# Patient Record
Sex: MALE | Employment: PART TIME | URBAN - METROPOLITAN AREA
[De-identification: names, ages, dates, MRNs, and addresses within clinical notes are randomized per-mention and may not be internally consistent; named-entity substitution may affect disease eponyms.]

---

## 2018-08-01 ENCOUNTER — HOSPITAL ENCOUNTER (INPATIENT)
Facility: CLINIC | Age: 43
LOS: 2 days | Discharge: HOME | DRG: 189 | End: 2018-08-03
Attending: EMERGENCY MEDICINE | Admitting: INTERNAL MEDICINE

## 2018-08-01 ENCOUNTER — APPOINTMENT (OUTPATIENT)
Dept: GENERAL RADIOLOGY | Facility: CLINIC | Age: 43
DRG: 189 | End: 2018-08-01
Attending: EMERGENCY MEDICINE

## 2018-08-01 DIAGNOSIS — J45.901 SEVERE ASTHMA WITH ACUTE EXACERBATION, UNSPECIFIED WHETHER PERSISTENT: ICD-10-CM

## 2018-08-01 LAB
ALBUMIN SERPL-MCNC: 3.8 G/DL (ref 3.4–5)
ALP SERPL-CCNC: 120 U/L (ref 40–150)
ALT SERPL W P-5'-P-CCNC: 41 U/L (ref 0–70)
ANION GAP SERPL CALCULATED.3IONS-SCNC: 7 MMOL/L (ref 3–14)
AST SERPL W P-5'-P-CCNC: 29 U/L (ref 0–45)
BASE DEFICIT BLDV-SCNC: 1 MMOL/L
BASE EXCESS BLDV CALC-SCNC: 0.1 MMOL/L
BASOPHILS # BLD AUTO: 0 10E9/L (ref 0–0.2)
BASOPHILS NFR BLD AUTO: 0.3 %
BILIRUB SERPL-MCNC: 0.4 MG/DL (ref 0.2–1.3)
BUN SERPL-MCNC: 10 MG/DL (ref 7–30)
CALCIUM SERPL-MCNC: 8.3 MG/DL (ref 8.5–10.1)
CHLORIDE SERPL-SCNC: 107 MMOL/L (ref 94–109)
CO2 SERPL-SCNC: 26 MMOL/L (ref 20–32)
CREAT SERPL-MCNC: 0.73 MG/DL (ref 0.66–1.25)
DIFFERENTIAL METHOD BLD: NORMAL
EOSINOPHIL # BLD AUTO: 0.5 10E9/L (ref 0–0.7)
EOSINOPHIL NFR BLD AUTO: 4.3 %
ERYTHROCYTE [DISTWIDTH] IN BLOOD BY AUTOMATED COUNT: 13.1 % (ref 10–15)
GFR SERPL CREATININE-BSD FRML MDRD: >90 ML/MIN/1.7M2
GLUCOSE BLDC GLUCOMTR-MCNC: 205 MG/DL (ref 70–99)
GLUCOSE BLDC GLUCOMTR-MCNC: 240 MG/DL (ref 70–99)
GLUCOSE SERPL-MCNC: 141 MG/DL (ref 70–99)
HBA1C MFR BLD: 6.3 % (ref 0–5.6)
HCO3 BLDV-SCNC: 26 MMOL/L (ref 21–28)
HCO3 BLDV-SCNC: 27 MMOL/L (ref 21–28)
HCT VFR BLD AUTO: 44.9 % (ref 40–53)
HGB BLD-MCNC: 15.6 G/DL (ref 13.3–17.7)
IMM GRANULOCYTES # BLD: 0 10E9/L (ref 0–0.4)
IMM GRANULOCYTES NFR BLD: 0.2 %
INTERPRETATION ECG - MUSE: NORMAL
LYMPHOCYTES # BLD AUTO: 4 10E9/L (ref 0.8–5.3)
LYMPHOCYTES NFR BLD AUTO: 38 %
MCH RBC QN AUTO: 30.3 PG (ref 26.5–33)
MCHC RBC AUTO-ENTMCNC: 34.7 G/DL (ref 31.5–36.5)
MCV RBC AUTO: 87 FL (ref 78–100)
MONOCYTES # BLD AUTO: 0.5 10E9/L (ref 0–1.3)
MONOCYTES NFR BLD AUTO: 5 %
NEUTROPHILS # BLD AUTO: 5.5 10E9/L (ref 1.6–8.3)
NEUTROPHILS NFR BLD AUTO: 52.2 %
NRBC # BLD AUTO: 0 10*3/UL
NRBC BLD AUTO-RTO: 0 /100
O2/TOTAL GAS SETTING VFR VENT: 100 %
OXYHGB MFR BLDV: 49 %
OXYHGB MFR BLDV: 92 %
PCO2 BLDV: 49 MM HG (ref 40–50)
PCO2 BLDV: 52 MM HG (ref 40–50)
PH BLDV: 7.31 PH (ref 7.32–7.43)
PH BLDV: 7.34 PH (ref 7.32–7.43)
PLATELET # BLD AUTO: 250 10E9/L (ref 150–450)
PO2 BLDV: 27 MM HG (ref 25–47)
PO2 BLDV: 69 MM HG (ref 25–47)
POTASSIUM SERPL-SCNC: 3.6 MMOL/L (ref 3.4–5.3)
PROCALCITONIN SERPL-MCNC: 0.73 NG/ML
PROT SERPL-MCNC: 7.9 G/DL (ref 6.8–8.8)
RBC # BLD AUTO: 5.15 10E12/L (ref 4.4–5.9)
SODIUM SERPL-SCNC: 140 MMOL/L (ref 133–144)
TROPONIN I SERPL-MCNC: <0.015 UG/L (ref 0–0.04)
WBC # BLD AUTO: 10.5 10E9/L (ref 4–11)

## 2018-08-01 PROCEDURE — 25000128 H RX IP 250 OP 636

## 2018-08-01 PROCEDURE — 84484 ASSAY OF TROPONIN QUANT: CPT | Performed by: EMERGENCY MEDICINE

## 2018-08-01 PROCEDURE — 83036 HEMOGLOBIN GLYCOSYLATED A1C: CPT | Performed by: INTERNAL MEDICINE

## 2018-08-01 PROCEDURE — 82805 BLOOD GASES W/O2 SATURATION: CPT | Performed by: EMERGENCY MEDICINE

## 2018-08-01 PROCEDURE — 80053 COMPREHEN METABOLIC PANEL: CPT | Performed by: EMERGENCY MEDICINE

## 2018-08-01 PROCEDURE — 99223 1ST HOSP IP/OBS HIGH 75: CPT | Mod: AI | Performed by: INTERNAL MEDICINE

## 2018-08-01 PROCEDURE — 25000132 ZZH RX MED GY IP 250 OP 250 PS 637: Performed by: INTERNAL MEDICINE

## 2018-08-01 PROCEDURE — 94660 CPAP INITIATION&MGMT: CPT

## 2018-08-01 PROCEDURE — 96372 THER/PROPH/DIAG INJ SC/IM: CPT

## 2018-08-01 PROCEDURE — 00000146 ZZHCL STATISTIC GLUCOSE BY METER IP

## 2018-08-01 PROCEDURE — 21000000 ZZH R&B IMCU HEART CARE

## 2018-08-01 PROCEDURE — 25000125 ZZHC RX 250: Performed by: INTERNAL MEDICINE

## 2018-08-01 PROCEDURE — 94640 AIRWAY INHALATION TREATMENT: CPT

## 2018-08-01 PROCEDURE — 99285 EMERGENCY DEPT VISIT HI MDM: CPT | Mod: 25

## 2018-08-01 PROCEDURE — 25000128 H RX IP 250 OP 636: Performed by: INTERNAL MEDICINE

## 2018-08-01 PROCEDURE — 25000131 ZZH RX MED GY IP 250 OP 636 PS 637: Performed by: INTERNAL MEDICINE

## 2018-08-01 PROCEDURE — 27210995 ZZH RX 272: Performed by: INTERNAL MEDICINE

## 2018-08-01 PROCEDURE — 40000275 ZZH STATISTIC RCP TIME EA 10 MIN

## 2018-08-01 PROCEDURE — 25000125 ZZHC RX 250

## 2018-08-01 PROCEDURE — 96374 THER/PROPH/DIAG INJ IV PUSH: CPT

## 2018-08-01 PROCEDURE — 36415 COLL VENOUS BLD VENIPUNCTURE: CPT | Performed by: INTERNAL MEDICINE

## 2018-08-01 PROCEDURE — 93005 ELECTROCARDIOGRAM TRACING: CPT

## 2018-08-01 PROCEDURE — 84145 PROCALCITONIN (PCT): CPT | Performed by: INTERNAL MEDICINE

## 2018-08-01 PROCEDURE — 40000885 ZZH STATISTIC STEP DOWN HRS EVENING

## 2018-08-01 PROCEDURE — 85025 COMPLETE CBC W/AUTO DIFF WBC: CPT | Performed by: EMERGENCY MEDICINE

## 2018-08-01 PROCEDURE — 71045 X-RAY EXAM CHEST 1 VIEW: CPT

## 2018-08-01 PROCEDURE — 82805 BLOOD GASES W/O2 SATURATION: CPT | Performed by: INTERNAL MEDICINE

## 2018-08-01 RX ORDER — ONDANSETRON 2 MG/ML
4 INJECTION INTRAMUSCULAR; INTRAVENOUS EVERY 6 HOURS PRN
Status: DISCONTINUED | OUTPATIENT
Start: 2018-08-01 | End: 2018-08-03 | Stop reason: HOSPADM

## 2018-08-01 RX ORDER — ALBUTEROL SULFATE 90 UG/1
1-2 AEROSOL, METERED RESPIRATORY (INHALATION) EVERY 4 HOURS PRN
COMMUNITY

## 2018-08-01 RX ORDER — NICOTINE POLACRILEX 4 MG
15-30 LOZENGE BUCCAL
Status: DISCONTINUED | OUTPATIENT
Start: 2018-08-01 | End: 2018-08-03 | Stop reason: HOSPADM

## 2018-08-01 RX ORDER — MOMETASONE FUROATE MONOHYDRATE 50 UG/1
2 SPRAY, METERED NASAL 2 TIMES DAILY
COMMUNITY

## 2018-08-01 RX ORDER — POTASSIUM CHLORIDE 1.5 G/1.58G
20-40 POWDER, FOR SOLUTION ORAL
Status: DISCONTINUED | OUTPATIENT
Start: 2018-08-01 | End: 2018-08-03 | Stop reason: HOSPADM

## 2018-08-01 RX ORDER — PROCHLORPERAZINE 25 MG
25 SUPPOSITORY, RECTAL RECTAL EVERY 12 HOURS PRN
Status: DISCONTINUED | OUTPATIENT
Start: 2018-08-01 | End: 2018-08-03 | Stop reason: HOSPADM

## 2018-08-01 RX ORDER — NALOXONE HYDROCHLORIDE 0.4 MG/ML
.1-.4 INJECTION, SOLUTION INTRAMUSCULAR; INTRAVENOUS; SUBCUTANEOUS
Status: DISCONTINUED | OUTPATIENT
Start: 2018-08-01 | End: 2018-08-03 | Stop reason: HOSPADM

## 2018-08-01 RX ORDER — AMOXICILLIN 250 MG
1 CAPSULE ORAL 2 TIMES DAILY PRN
Status: DISCONTINUED | OUTPATIENT
Start: 2018-08-01 | End: 2018-08-03 | Stop reason: HOSPADM

## 2018-08-01 RX ORDER — LORATADINE 10 MG/1
10 TABLET ORAL EVERY MORNING
Status: DISCONTINUED | OUTPATIENT
Start: 2018-08-02 | End: 2018-08-03 | Stop reason: HOSPADM

## 2018-08-01 RX ORDER — LORATADINE 10 MG/1
10 TABLET ORAL EVERY MORNING
COMMUNITY

## 2018-08-01 RX ORDER — ALBUTEROL SULFATE 0.83 MG/ML
3 SOLUTION RESPIRATORY (INHALATION)
Status: DISCONTINUED | OUTPATIENT
Start: 2018-08-01 | End: 2018-08-03 | Stop reason: HOSPADM

## 2018-08-01 RX ORDER — ATORVASTATIN CALCIUM 20 MG/1
20 TABLET, FILM COATED ORAL AT BEDTIME
Status: DISCONTINUED | OUTPATIENT
Start: 2018-08-01 | End: 2018-08-03 | Stop reason: HOSPADM

## 2018-08-01 RX ORDER — MOMETASONE FUROATE MONOHYDRATE 50 UG/1
2 SPRAY, METERED NASAL 2 TIMES DAILY
Status: DISCONTINUED | OUTPATIENT
Start: 2018-08-01 | End: 2018-08-01

## 2018-08-01 RX ORDER — EPINEPHRINE 0.3 MG/.3ML
0.3 INJECTION SUBCUTANEOUS ONCE
Status: DISCONTINUED | OUTPATIENT
Start: 2018-08-01 | End: 2018-08-01

## 2018-08-01 RX ORDER — MORPHINE SULFATE 2 MG/ML
INJECTION, SOLUTION INTRAMUSCULAR; INTRAVENOUS
Status: COMPLETED
Start: 2018-08-01 | End: 2018-08-01

## 2018-08-01 RX ORDER — ALBUTEROL SULFATE 0.83 MG/ML
3 SOLUTION RESPIRATORY (INHALATION) EVERY 4 HOURS
Status: DISCONTINUED | OUTPATIENT
Start: 2018-08-01 | End: 2018-08-03 | Stop reason: HOSPADM

## 2018-08-01 RX ORDER — ONDANSETRON 4 MG/1
4 TABLET, ORALLY DISINTEGRATING ORAL EVERY 6 HOURS PRN
Status: DISCONTINUED | OUTPATIENT
Start: 2018-08-01 | End: 2018-08-03 | Stop reason: HOSPADM

## 2018-08-01 RX ORDER — FLUTICASONE PROPIONATE 50 MCG
2 SPRAY, SUSPENSION (ML) NASAL DAILY
Status: DISCONTINUED | OUTPATIENT
Start: 2018-08-02 | End: 2018-08-03 | Stop reason: HOSPADM

## 2018-08-01 RX ORDER — AMOXICILLIN 250 MG
2 CAPSULE ORAL 2 TIMES DAILY PRN
Status: DISCONTINUED | OUTPATIENT
Start: 2018-08-01 | End: 2018-08-03 | Stop reason: HOSPADM

## 2018-08-01 RX ORDER — IPRATROPIUM BROMIDE AND ALBUTEROL SULFATE 2.5; .5 MG/3ML; MG/3ML
3 SOLUTION RESPIRATORY (INHALATION)
Status: DISCONTINUED | OUTPATIENT
Start: 2018-08-01 | End: 2018-08-03 | Stop reason: HOSPADM

## 2018-08-01 RX ORDER — MORPHINE SULFATE 2 MG/ML
2 INJECTION, SOLUTION INTRAMUSCULAR; INTRAVENOUS ONCE
Status: COMPLETED | OUTPATIENT
Start: 2018-08-01 | End: 2018-08-01

## 2018-08-01 RX ORDER — POTASSIUM CHLORIDE 1500 MG/1
20-40 TABLET, EXTENDED RELEASE ORAL
Status: DISCONTINUED | OUTPATIENT
Start: 2018-08-01 | End: 2018-08-03 | Stop reason: HOSPADM

## 2018-08-01 RX ORDER — AZITHROMYCIN 250 MG/1
500 TABLET, FILM COATED ORAL ONCE
Status: COMPLETED | OUTPATIENT
Start: 2018-08-02 | End: 2018-08-02

## 2018-08-01 RX ORDER — IPRATROPIUM BROMIDE AND ALBUTEROL SULFATE 2.5; .5 MG/3ML; MG/3ML
SOLUTION RESPIRATORY (INHALATION)
Status: COMPLETED
Start: 2018-08-01 | End: 2018-08-01

## 2018-08-01 RX ORDER — POTASSIUM CHLORIDE 29.8 MG/ML
20 INJECTION INTRAVENOUS
Status: DISCONTINUED | OUTPATIENT
Start: 2018-08-01 | End: 2018-08-03 | Stop reason: HOSPADM

## 2018-08-01 RX ORDER — BISACODYL 10 MG
10 SUPPOSITORY, RECTAL RECTAL DAILY PRN
Status: DISCONTINUED | OUTPATIENT
Start: 2018-08-01 | End: 2018-08-03 | Stop reason: HOSPADM

## 2018-08-01 RX ORDER — DEXTROSE MONOHYDRATE 25 G/50ML
25-50 INJECTION, SOLUTION INTRAVENOUS
Status: DISCONTINUED | OUTPATIENT
Start: 2018-08-01 | End: 2018-08-03 | Stop reason: HOSPADM

## 2018-08-01 RX ORDER — METHYLPREDNISOLONE SODIUM SUCCINATE 125 MG/2ML
60 INJECTION, POWDER, LYOPHILIZED, FOR SOLUTION INTRAMUSCULAR; INTRAVENOUS EVERY 6 HOURS
Status: DISCONTINUED | OUTPATIENT
Start: 2018-08-01 | End: 2018-08-03 | Stop reason: HOSPADM

## 2018-08-01 RX ORDER — ALBUTEROL SULFATE 90 UG/1
1-2 AEROSOL, METERED RESPIRATORY (INHALATION) EVERY 4 HOURS PRN
Status: DISCONTINUED | OUTPATIENT
Start: 2018-08-01 | End: 2018-08-03 | Stop reason: HOSPADM

## 2018-08-01 RX ORDER — POTASSIUM CL/LIDO/0.9 % NACL 10MEQ/0.1L
10 INTRAVENOUS SOLUTION, PIGGYBACK (ML) INTRAVENOUS
Status: DISCONTINUED | OUTPATIENT
Start: 2018-08-01 | End: 2018-08-03 | Stop reason: HOSPADM

## 2018-08-01 RX ORDER — POTASSIUM CHLORIDE 7.45 MG/ML
10 INJECTION INTRAVENOUS
Status: DISCONTINUED | OUTPATIENT
Start: 2018-08-01 | End: 2018-08-03 | Stop reason: HOSPADM

## 2018-08-01 RX ORDER — TESTOSTERONE CYPIONATE 200 MG/ML
200 INJECTION, SOLUTION INTRAMUSCULAR
COMMUNITY

## 2018-08-01 RX ORDER — AZITHROMYCIN 250 MG/1
250 TABLET, FILM COATED ORAL EVERY EVENING
Status: DISCONTINUED | OUTPATIENT
Start: 2018-08-02 | End: 2018-08-02

## 2018-08-01 RX ORDER — MAGNESIUM SULFATE HEPTAHYDRATE 40 MG/ML
4 INJECTION, SOLUTION INTRAVENOUS EVERY 4 HOURS PRN
Status: DISCONTINUED | OUTPATIENT
Start: 2018-08-01 | End: 2018-08-03 | Stop reason: HOSPADM

## 2018-08-01 RX ORDER — PROCHLORPERAZINE MALEATE 10 MG
10 TABLET ORAL EVERY 6 HOURS PRN
Status: DISCONTINUED | OUTPATIENT
Start: 2018-08-01 | End: 2018-08-03 | Stop reason: HOSPADM

## 2018-08-01 RX ORDER — POLYETHYLENE GLYCOL 3350 17 G/17G
17 POWDER, FOR SOLUTION ORAL DAILY PRN
Status: DISCONTINUED | OUTPATIENT
Start: 2018-08-01 | End: 2018-08-03 | Stop reason: HOSPADM

## 2018-08-01 RX ORDER — ACETAMINOPHEN 325 MG/1
650 TABLET ORAL EVERY 4 HOURS PRN
Status: DISCONTINUED | OUTPATIENT
Start: 2018-08-01 | End: 2018-08-03 | Stop reason: HOSPADM

## 2018-08-01 RX ORDER — AZITHROMYCIN 250 MG/1
500 TABLET, FILM COATED ORAL ONCE
Status: DISCONTINUED | OUTPATIENT
Start: 2018-08-02 | End: 2018-08-01

## 2018-08-01 RX ADMIN — IPRATROPIUM BROMIDE AND ALBUTEROL SULFATE 3 ML: .5; 3 SOLUTION RESPIRATORY (INHALATION) at 23:59

## 2018-08-01 RX ADMIN — MORPHINE SULFATE 2 MG: 2 INJECTION, SOLUTION INTRAMUSCULAR; INTRAVENOUS at 13:47

## 2018-08-01 RX ADMIN — ENOXAPARIN SODIUM 40 MG: 40 INJECTION SUBCUTANEOUS at 19:16

## 2018-08-01 RX ADMIN — POTASSIUM CHLORIDE: 149 INJECTION, SOLUTION, CONCENTRATE INTRAVENOUS at 19:10

## 2018-08-01 RX ADMIN — METHYLPREDNISOLONE SODIUM SUCCINATE 62.5 MG: 125 INJECTION, POWDER, FOR SOLUTION INTRAMUSCULAR; INTRAVENOUS at 19:16

## 2018-08-01 RX ADMIN — IPRATROPIUM BROMIDE AND ALBUTEROL SULFATE 6 ML: .5; 3 SOLUTION RESPIRATORY (INHALATION) at 13:56

## 2018-08-01 RX ADMIN — EPINEPHRINE 0.3 MG: 1 INJECTION INTRAMUSCULAR; INTRAVENOUS; SUBCUTANEOUS at 13:50

## 2018-08-01 RX ADMIN — ATORVASTATIN CALCIUM 20 MG: 20 TABLET, FILM COATED ORAL at 21:27

## 2018-08-01 RX ADMIN — INSULIN ASPART 3 UNITS: 100 INJECTION, SOLUTION INTRAVENOUS; SUBCUTANEOUS at 19:16

## 2018-08-01 ASSESSMENT — ACTIVITIES OF DAILY LIVING (ADL)
BATHING: 0 - INDEPENDENT
TOILETING: 0-->INDEPENDENT
TOILETING: 0 - INDEPENDENT
SWALLOWING: 0 - SWALLOWS FOODS/LIQUIDS WITHOUT DIFFICULTY
FALL_HISTORY_WITHIN_LAST_SIX_MONTHS: NO
COGNITION: 0 - NO COGNITION ISSUES REPORTED
BATHING: 0-->INDEPENDENT
SWALLOWING: 0-->SWALLOWS FOODS/LIQUIDS WITHOUT DIFFICULTY
RETIRED_EATING: 0-->INDEPENDENT
RETIRED_COMMUNICATION: 0-->UNDERSTANDS/COMMUNICATES WITHOUT DIFFICULTY
AMBULATION: 0 - INDEPENDENT
TRANSFERRING: 0-->INDEPENDENT
DRESS: 0-->INDEPENDENT
TRANSFERRING: 0 - INDEPENDENT
AMBULATION: 0-->INDEPENDENT
DRESS: 0 - INDEPENDENT
COMMUNICATION: 0 - UNDERSTANDS/COMMUNICATES WITHOUT DIFFICULTY
EATING: 0 - INDEPENDENT

## 2018-08-01 ASSESSMENT — PAIN DESCRIPTION - DESCRIPTORS: DESCRIPTORS: BURNING

## 2018-08-01 ASSESSMENT — ENCOUNTER SYMPTOMS
HEADACHES: 1
SHORTNESS OF BREATH: 1

## 2018-08-01 NOTE — ED NOTES
Able to get ahold of pt son, he is coming to the ER, pt states his breathing is better, remains on bipap
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
## 2018-08-01 NOTE — ED NOTES
[facility]  ED Nurse Handoff Report    ED Chief complaint: Respiratory Distress (pt at work and became very sob, pt states he has been sob for a week, hx of asthma, on bipap via ems)      ED Diagnosis:   Final diagnoses:   None       Code Status: Full Code    Allergies:   Allergies   Allergen Reactions     Ibuprofen        Activity level - Baseline/Home:  Independent    Activity Level - Current:   Independent     Needed?: Yes    Isolation: No  Infection: Not Applicable  Bariatric?: No    Vital Signs:   Vitals:    08/01/18 1344 08/01/18 1400 08/01/18 1415 08/01/18 1430   BP: (!) 163/95 (!) 154/94 (!) 124/95    Pulse:       Resp: 22 17 14 12   Temp:       TempSrc:       SpO2: 97% 100% 100%    Weight:           Cardiac Rhythm: ,   Cardiac  Cardiac Rhythm: Sinus tachycardia    Pain level:      Is this patient confused?: No   [place]   Suicide Severity Rating Scale Completed?  yes  If yes, what color did the patient score?  White    Patient Report: Initial Complaint: resp distress  Focused Assessment: pt as work today and developed severe resp distress, pt with a hx of asthma and has been sob for the past week, worse today. sats 84 on RA for EMS, pt started on bipap in the field and given IV mag and solumedrol and a duoneb. Upon arrival to ER, pt remained on bipap 12/6 at 100% fio2 which is now decreased to 80%. Pt is [place] speaking yet understands some english. Pt alert and oriented. Pt taken off bipap at 1530 and tolerating well.   Tests Performed: labs, xray  Abnormal Results:   Results for orders placed or performed during the hospital encounter of 08/01/18   XR Chest Port 1 View    Narrative    CHEST ONE VIEW PORTABLE   8/1/2018 1:53 PM     HISTORY:  Shortness of breath. Chest pain.    COMPARISON: None.      Impression    IMPRESSION: Negative chest. Lungs clear.    MITUL REYEZ MD   CBC with platelets differential   Result Value Ref Range    WBC 10.5 4.0 - 11.0 10e9/L    RBC Count  5.15 4.4 - 5.9 10e12/L    Hemoglobin 15.6 13.3 - 17.7 g/dL    Hematocrit 44.9 40.0 - 53.0 %    MCV 87 78 - 100 fl    MCH 30.3 26.5 - 33.0 pg    MCHC 34.7 31.5 - 36.5 g/dL    RDW 13.1 10.0 - 15.0 %    Platelet Count 250 150 - 450 10e9/L    Diff Method Automated Method     % Neutrophils 52.2 %    % Lymphocytes 38.0 %    % Monocytes 5.0 %    % Eosinophils 4.3 %    % Basophils 0.3 %    % Immature Granulocytes 0.2 %    Nucleated RBCs 0 0 /100    Absolute Neutrophil 5.5 1.6 - 8.3 10e9/L    Absolute Lymphocytes 4.0 0.8 - 5.3 10e9/L    Absolute Monocytes 0.5 0.0 - 1.3 10e9/L    Absolute Eosinophils 0.5 0.0 - 0.7 10e9/L    Absolute Basophils 0.0 0.0 - 0.2 10e9/L    Abs Immature Granulocytes 0.0 0 - 0.4 10e9/L    Absolute Nucleated RBC 0.0    Comprehensive metabolic panel   Result Value Ref Range    Sodium 140 133 - 144 mmol/L    Potassium 3.6 3.4 - 5.3 mmol/L    Chloride 107 94 - 109 mmol/L    Carbon Dioxide 26 20 - 32 mmol/L    Anion Gap 7 3 - 14 mmol/L    Glucose 141 (H) 70 - 99 mg/dL    Urea Nitrogen 10 7 - 30 mg/dL    Creatinine 0.73 0.66 - 1.25 mg/dL    GFR Estimate >90 >60 mL/min/1.7m2    GFR Estimate If Black >90 >60 mL/min/1.7m2    Calcium 8.3 (L) 8.5 - 10.1 mg/dL    Bilirubin Total 0.4 0.2 - 1.3 mg/dL    Albumin 3.8 3.4 - 5.0 g/dL    Protein Total 7.9 6.8 - 8.8 g/dL    Alkaline Phosphatase 120 40 - 150 U/L    ALT 41 0 - 70 U/L    AST 29 0 - 45 U/L   Blood gas venous and oxyhgb   Result Value Ref Range    Ph Venous 7.31 (L) 7.32 - 7.43 pH    PCO2 Venous 52 (H) 40 - 50 mm Hg    PO2 Venous 69 (H) 25 - 47 mm Hg    Bicarbonate Venous 26 21 - 28 mmol/L    FIO2 100     Oxyhemoglobin Venous 92 %    Base Deficit Venous 1.0 mmol/L   Troponin I   Result Value Ref Range    Troponin I ES <0.015 0.000 - 0.045 ug/L   EKG 12-lead, tracing only   Result Value Ref Range    Interpretation ECG Click View Image link to view waveform and result      Treatments provided: meds, bipap    Family Comments: son coming     OBS brochure/video  discussed/provided to patient: N/A    ED Medications:   Medications   ipratropium - albuterol 0.5 mg/2.5 mg/3 mL (DUONEB) 0.5-2.5 (3) MG/3ML neb solution (6 mLs Nebulization Given 8/1/18 1356)   morphine (PF) injection 2 mg (2 mg Intravenous Given 8/1/18 1347)   EPINEPHrine (ADRENALIN) kit 0.3 mg (0.3 mg Intramuscular Given 8/1/18 1350)       Drips infusing?:  No    For the majority of the shift this patient was Green.   Interventions performed were none.    Severe Sepsis OR Septic Shock Diagnosis Present: No      ED NURSE PHONE NUMBER: [phone]

## 2018-08-01 NOTE — ED NOTES
RN at bedside, pt reports feeling much better than when he arrived to the hospital. Pt resting comfortably with bipap on and respiratory rate normal. RN titrated fio2 from 80% to 60%. Pt updated on plan of care and has no needs at this time.

## 2018-08-01 NOTE — ED PROVIDER NOTES
History     Chief Complaint:  Shortness of breath    HPI   Chandana Gómez is a 43 year old male with a history of asthma who presents to the emergency department today for evaluation of shortness of breath. EMS reports that the patient is coming from work. EMS states that the patient's coworkers called after the patient was not feeling well this morning and was found purple. EMS notes oxygen saturations were around 83-84 but there was little air movement. EMS administered 125 ml of Solu-Medrol, 2 g of magnesium, and 1 DuoNeb. EMS states that after the DuoNeb his saturation went to 93. Here in the ED, the patient reports 1 week of shortness of breath with minor relief with inhalers. He also describes a bad headache that started after the shortness of breath. He denies any previous intubations or hospitalizations from asthma exacerbation.     Allergies:  No Known Drug Allergies    Medications:    Medications reviewed. No current medications.     Past Medical History:    Asthma    Past Surgical History:    Surgical history reviewed. No pertinent surgical history.    Family History:    Family history reviewed. No pertinent family history.     Social History:  The patient was brought to the ED via EMS.    Review of Systems   Respiratory: Positive for shortness of breath.    Neurological: Positive for headaches.   All other systems reviewed and are negative.      Physical Exam     Patient Vitals for the past 24 hrs:   BP Temp Temp src Pulse Heart Rate Resp SpO2 Weight   08/01/18 1600 130/78 - - - 109 11 99 % -   08/01/18 1549 - - - - 113 14 99 % -   08/01/18 1545 126/79 - - - 112 14 99 % -   08/01/18 1536 - - - - 112 16 100 % -   08/01/18 1530 115/85 - - - 116 15 100 % -   08/01/18 1515 144/81 - - - 113 14 100 % -   08/01/18 1500 141/85 - - - 112 13 100 % -   08/01/18 1445 142/88 - - - 115 12 100 % -   08/01/18 1441 - - - - 117 17 100 % -   08/01/18 1440 (!) 135/94 - - - - - - -   08/01/18 1430 - - - - 116 12 - -    08/01/18 1415 (!) 124/95 - - - 124 14 100 % -   08/01/18 1400 (!) 154/94 - - - 122 17 100 % -   08/01/18 1344 (!) 163/95 - - - 113 22 97 % -   08/01/18 1335 (!) 150/118 99.2  F (37.3  C) Temporal 114 - 24 100 % 118.4 kg (261 lb 0.4 oz)       Physical Exam  Vitals: reviewed by me  General: Pt seen on hospitals, in severe distress, sitting bolt upright  Eyes: Tracking well, clear conjunctiva BL  ENT: MMM, midline trachea.   Lungs:  Very poor air movement BL, + severe distress, + prolonged expiratory phase.  Tolerating secretions.   CV: Rate as above, regular rhythm.    Abd: Soft, non tender, no guarding, no rebound. Non distended  MSK: no peripheral edema or joint effusion.  No evidence of trauma  Skin: No rash, normal turgor and temperature  Neuro: Clear speech and no facial droop.  Moving all ext spontaneously.   Psych: Not RIS, no e/o AH/VH      Emergency Department Course     ECG:  ECG taken at 1355, ECG read at 1405  Sinus tachycardia  Otherwise normal ECG  Rate 114 bpm. ID interval 164 ms. QRS duration 78 ms. QT/QTc 316/435 ms. P-R-T axes 75 60 34.    Imaging:  Radiology findings were communicated with the patient who voiced understanding of the findings.    XR Chest Port 1 View  Negative chest. Lungs clear.  Reading per radiology     Laboratory:  Laboratory findings were communicated with the patient who voiced understanding of the findings.    CBC: WBC 10.5, HGB 15.6,   CMP: Glucose 141 (H), Calcium 8.3 (L) o/w WNL (Creatinine 0.73)  Blood Gas Venous and Oxyhgb (Collected: 1340): pH: 7.31 (L), PCO2: 52 (H), PO2: 69 (H), Bicarbonate: 26, FlO2: 100, Oxyhemoglobin: 92, Base Deficit: 1.0   Troponin (Collected 1340): <0.015    Interventions:  1347 Morphine 2 mg IV  1350 Epinephrine 0.3 mg IM  1356 DuoNeb 6 ml Nebulization    Emergency Department Course:    1335 Patient arrived via EMS    1337 I performed an exam of the patient as documented above.    1347 IV was inserted and blood was drawn for  laboratory testing, results above.    1352 A XR Chest Port 1 View was performed while in the emergency department, results above.     1504 The patient was rechecked and updated.     1604 I discussed the treatment plan with the patient. They expressed understanding of this plan and consented to admission. I discussed the patient with Dr. Pierce, who will admit the patient to a monitored bed for further evaluation and treatment.    I personally reviewed the ECG, imaging, and laboratory results with the patient and answered all related questions prior to admission.    Impression & Plan      Medical Decision Making:  Chandana Gómez is a 43 year old male who presents to the emergency department today for evaluation of respiratory distress, likely due to sever asthma exacerbation. I feel this diagnosis is supported by a prolonged expiratory phase on arrival, a very poor amount of air movement and very tight sounding chest on ausculation initially and then wheezes after receiving treatment. He has improved substantially here in the ER after BiPAP, DuoNebs, steroids, magnesium, and did require one shot of IM epinephrine, but at no point did he need to be intubated, no evidence of CO2 narcosis, and he continues to be well appearing. Will admit to the care of Dr. Pierce who generously agrees to accept care of the patient for continued asthmatic cares. Chest xray without any evidence of pneumonia, patient has low grade tachycardia here in the ER, but otherwise appears to be improving.     Diagnosis:    ICD-10-CM    1. Severe asthma with acute exacerbation, unspecified whether persistent J45.901      Disposition:   The patient is admitted into the care of Dr. Pierce.    Critical Care time was 45 minutes for this patient excluding procedures.     Scribe Disclosure:  SOFIE, Cecilia Varela, am serving as a scribe at 1:56 PM on 8/1/2018 to document services personally performed by Mark Eddy MD based on my  observations and the provider's statements to me.     EMERGENCY DEPARTMENT       Mark Eddy MD  08/01/18 6000

## 2018-08-01 NOTE — ED NOTES
Pt reports his breathing feels harder once bipap is off, pt on 4 liters NS but no increase work of breathing present and pt vitals stable at this time. Pt updated to alert staff is breathing status changes or becomes more difficult

## 2018-08-01 NOTE — IP AVS SNAPSHOT
MRN:[id number]                      After Visit Summary   8/1/2018    Chandana Gómez    MRN: [id number]           Thank you!     Thank you for choosing [facility] for your care. Our goal is always to provide you with excellent care. Hearing back from our patients is one way we can continue to improve our services. Please take a few minutes to complete the written survey that you may receive in the mail after you visit with us. Thank you!        Patient Information     Date Of Birth          1975        Designated Caregiver       Most Recent Value    Caregiver    Will someone help with your care after discharge? yes    Name of designated caregiver  Sofi Morales- lula    Phone number of caregiver [phone]    Caregiver address [address], [place]      About your hospital stay     You were admitted on:  August 1, 2018 [facility]    You were discharged on:  August 3, 2018        Reason for your hospital stay       Shortness of breath                  Who to Call     For medical emergencies, please call 911.  For non-urgent questions about your medical care, please call your primary care provider or clinic, [phone]          Attending Provider     Provider Mark Guidry MD Emergency Medicine    Luana Pierce MD Internal Medicine       Primary Care Provider Office Phone # Fax #    [facility] [phone]      After Care Instructions     Activity       Your activity upon discharge: activity as tolerated            Diet       Follow this diet upon discharge: Orders Placed This Encounter      Combination Diet Regular Diet Adult                  Follow-up Appointments     Follow-up and recommended labs and tests        Follow up with primary care provider, [facility], within 7 days for hospital follow- up.  The following labs/tests are  "recommended: cbc/bmp.  You have an appointment on Friday, August 10, 2018 at 3:00 pm with Dr. Laxmi James. Please bring your discharge paperwork with you to this appointment.       Follow up with primary care provider pulmonary medicine as directed, please have Dr. Laxmi James place a referral for pulmonary medicine at [facility] in order an appointment to be scheduled.                  Pending Results     Date and Time Order Name Status Description    2018 1852 EKG 12-lead, tracing only Preliminary     2018 1758 Blood culture Preliminary     2018 1758 Blood culture Preliminary             Statement of Approval     Ordered          18 1111  I have reviewed and agree with all the recommendations and orders detailed in this document.  EFFECTIVE NOW     Approved and electronically signed by:  Pietro Vaughn DO             Admission Information     Date & Time Provider Department Dept. Phone    2018 Luana Pierce MD [facility] [phone]      Your Vitals Were     Blood Pressure Pulse Temperature Respirations Height Weight    132/73 (BP Location: Left arm) 114 97.8  F (36.6  C) (Axillary) 16 1.753 m (5' 9\") 116.6 kg (257 lb)    Pulse Oximetry BMI (Body Mass Index)                96% 37.95 kg/m2          MyChart Information     [facility] lets you send messages to your doctor, view your test results, renew your prescriptions, schedule appointments and more. To sign up, go to [email or web address] . Click on \"Log in\" on the left side of the screen, which will take you to the Welcome page. Then click on \"Sign up Now\" on the right side of the page.     You will be asked to enter the access code listed below, as well as some personal information. Please follow the directions to create your username and password.     Your access code is: [email or web address]  Expires: 2018 11:55 AM     Your access code will  in 90 days. If you need help or a new code, please call " your [facility] clinic or [phone].        Care EveryWhere ID     This is your Care EveryWhere ID. This could be used by other organizations to access your [facility] medical records  [id number]        Equal Access to Services     BRANNON OROZCO: Niki Mathews, waaxda luqadaha, qaybta kaalmada adeegyada, jovani jasbirin hayaaremberto escalantelondonliu orozco. So [facility] [phone].    ATENCIÓN: Si habla español, tiene a hansen disposición servicios gratuitos de asistencia lingüística. Llame al [phone].    We comply with applicable federal civil rights laws and [place] laws. We do not discriminate on the basis of race, color, national origin, age, disability, sex, sexual orientation, or gender identity.               Review of your medicines      START taking        Dose / Directions    levofloxacin 500 MG tablet   Commonly known as:  LEVAQUIN   Used for:  Severe asthma with acute exacerbation, unspecified whether persistent   Notes to Patient:  Take this Medication Once a Day until all 5 tablets are gone        Dose:  500 mg   Take 1 tablet (500 mg) by mouth daily for 5 days   Quantity:  5 tablet   Refills:  0       predniSONE 10 MG tablet   Commonly known as:  DELTASONE   Used for:  Severe asthma with acute exacerbation, unspecified whether persistent   Notes to Patient:  Take this Medication Once a Day ( Follow Directions ( Dosing ) as you taper off Medication        4 tabs daily for 3 days, then 3 tabs daily for 3 days, then 2 tabs daily for 3 days, then 1 tab daily for 3 days, then stop   Quantity:  30 tablet   Refills:  0         CONTINUE these medicines which have NOT CHANGED        Dose / Directions    ACETAMINOPHEN PO   Notes to Patient:  Take this medication as needed for pain Twice a Day        Dose:  1000 mg   Take 1,000 mg by mouth 2 times daily as needed for pain   Refills:  0       albuterol 108 (90 Base) MCG/ACT Inhaler   Commonly known as:  PROAIR HFA/PROVENTIL HFA/VENTOLIN HFA   Notes to  Patient:  Take as Needed for Shortness of Breath 4 times a Day        Dose:  1-2 puff   Inhale 1-2 puffs into the lungs every 4 hours as needed for shortness of breath / dyspnea or wheezing   Refills:  0       LIPITOR PO   Notes to Patient:  Take this Medication Once a Day at Bedtime        Dose:  20 mg   Take 20 mg by mouth At Bedtime   Refills:  0       loratadine 10 MG tablet   Commonly known as:  CLARITIN   Notes to Patient:  Take this Medication Once a Day        Dose:  10 mg   Take 10 mg by mouth every morning   Refills:  0       mometasone 50 MCG/ACT spray   Commonly known as:  NASONEX   Notes to Patient:  Take this Medication Twice a Day        Dose:  2 spray   Spray 2 sprays into both nostrils 2 times daily   Refills:  0       OMEPRAZOLE PO   Notes to Patient:  Take this Medication Once a Day        Dose:  20 mg   Take 20 mg by mouth every morning   Refills:  0       testosterone cypionate 200 MG/ML injection   Commonly known as:  DEPOTESTOTERONE   Notes to Patient:  Take this Medication Every 2 weeks ( 14 days)        Dose:  200 mg   Inject 200 mg into the muscle every 14 days   Refills:  0       VITAMIN D (CHOLECALCIFEROL) PO   Notes to Patient:  Take this Medication Once a Day        Dose:  1000 Units   Take 1,000 Units by mouth every morning   Refills:  0            Where to get your medicines      These medications were sent to [facility] - [address]  [address] [facility] [address], [place] [address]     Phone:  [phone]     levofloxacin 500 MG tablet    predniSONE 10 MG tablet                Protect others around you: Learn how to safely use, store and throw away your medicines at www.disposemymeds.org.        ANTIBIOTIC INSTRUCTION     You've Been Prescribed an Antibiotic - Now What?  Your healthcare team thinks that you or your loved one might have an infection. Some infections can be treated with antibiotics, which are powerful, life-saving drugs. Like all  medications, antibiotics have side effects and should only be used when necessary. There are some important things you should know about your antibiotic treatment.      Your healthcare team may run tests before you start taking an antibiotic.    Your team may take samples (e.g., from your blood, urine or other areas) to run tests to look for bacteria. These test can be important to determine if you need an antibiotic at all and, if you do, which antibiotic will work best.      Within a few days, your healthcare team might change or even stop your antibiotic.    Your team may start you on an antibiotic while they are working to find out what is making you sick.    Your team might change your antibiotic because test results show that a different antibiotic would be better to treat your infection.    In some cases, once your team has more information, they learn that you do not need an antibiotic at all. They may find out that you don't have an infection, or that the antibiotic you're taking won't work against your infection. For example, an infection caused by a virus can't be treated with antibiotics. Staying on an antibiotic when you don't need it is more likely to be harmful than helpful.      You may experience side effects from your antibiotic.    Like all medications, antibiotics have side effects. Some of these can be serious.    Let you healthcare team know if you have any known allergies when you are admitted to the hospital.    One significant side effect of nearly all antibiotics is the risk of severe and sometimes deadly diarrhea caused by Clostridium difficile (C. Difficile). This occurs when a person takes antibiotics because some good germs are destroyed. Antibiotic use allows C. diificile to take over, putting patients at high risk for this serious infection.    As a patient or caregiver, it is important to understand your or your loved one's antibiotic treatment. It is especially important for  caregivers to speak up when patients can't speak for themselves. Here are some important questions to ask your healthcare team.    What infection is this antibiotic treating and how do you know I have that infection?    What side effects might occur from this antibiotic?    How long will I need to take this antibiotic?    Is it safe to take this antibiotic with other medications or supplements (e.g., vitamins) that I am taking?     Are there any special directions I need to know about taking this antibiotic? For example, should I take it with food?    How will I be monitored to know whether my infection is responding to the antibiotic?    What tests may help to make sure the right antibiotic is prescribed for me?      Information provided by:  www.cdc.gov/getsmart  U.S. Department of Health and Human Services  Centers for disease Control and Prevention  National Center for Emerging and Zoonotic Infectious Diseases  Division of Healthcare Quality Promotion             Medication List: This is a list of all your medications and when to take them. Check marks below indicate your daily home schedule. Keep this list as a reference.      Medications           Morning Afternoon Evening Bedtime As Needed    ACETAMINOPHEN PO   Take 1,000 mg by mouth 2 times daily as needed for pain   Last time this was given:  650 mg on 8/3/2018  9:02 AM   Notes to Patient:  Take this medication as needed for pain Twice a Day                            Take this Medication as needed for pain Twice a Day       albuterol 108 (90 Base) MCG/ACT Inhaler   Commonly known as:  PROAIR HFA/PROVENTIL HFA/VENTOLIN HFA   Inhale 1-2 puffs into the lungs every 4 hours as needed for shortness of breath / dyspnea or wheezing   Last time this was given:  2 puffs on 8/3/2018  9:03 AM   Notes to Patient:  Take as Needed for Shortness of Breath 4 times a Day                            Take as Needed 4 times a Day.        levofloxacin 500 MG tablet   Commonly  known as:  LEVAQUIN   Take 1 tablet (500 mg) by mouth daily for 5 days   Next Dose Due:  Friday August 3rd in the Evening/ Bedtime   Notes to Patient:  Take this Medication Once a Day until all 5 tablets are gone                    Take 1st Oral Dose this Evening/ Bedtime               LIPITOR PO   Take 20 mg by mouth At Bedtime   Last time this was given:  20 mg on 8/2/2018 11:45 PM   Next Dose Due:  Friday August 3rd in the Evening/ Bedtime   Notes to Patient:  Take this Medication Once a Day at Bedtime                        Take this Medication In the Evening or at Bedtime           loratadine 10 MG tablet   Commonly known as:  CLARITIN   Take 10 mg by mouth every morning   Last time this was given:  10 mg on 8/3/2018  9:02 AM   Next Dose Due:  Saturday August 4th in the Morning   Notes to Patient:  Take this Medication Once a Day            Take this Medication in the Morning                       mometasone 50 MCG/ACT spray   Commonly known as:  NASONEX   Spray 2 sprays into both nostrils 2 times daily   Next Dose Due:  Friday August 3rd in the Evening/ Bedtime   Notes to Patient:  Take this Medication Twice a Day            Take 1st Dose in the Morning           Take 2nd Dose in the Evening/ Bedtime               OMEPRAZOLE PO   Take 20 mg by mouth every morning   Last time this was given:  20 mg on 8/3/2018  9:02 AM   Next Dose Due:  Saturday August 4th in the Morning before Breakfast   Notes to Patient:  Take this Medication Once a Day            Take this Medication in the Morning Before Breakfast                       predniSONE 10 MG tablet   Commonly known as:  DELTASONE   4 tabs daily for 3 days, then 3 tabs daily for 3 days, then 2 tabs daily for 3 days, then 1 tab daily for 3 days, then stop   Next Dose Due:  Saturday August 4th in the Morning ( 1st Oral Dose)   Notes to Patient:  Take this Medication Once a Day ( Follow Directions ( Dosing ) as you taper off Medication            Take this Mediation  in the Morning                       testosterone cypionate 200 MG/ML injection   Commonly known as:  DEPOTESTOTERONE   Inject 200 mg into the muscle every 14 days   Next Dose Due:  Friday August 10th as previously rescheduled   Notes to Patient:  Take this Medication Every 2 weeks ( 14 days)                                VITAMIN D (CHOLECALCIFEROL) PO   Take 1,000 Units by mouth every morning   Last time this was given:  1,000 Units on 8/3/2018  9:02 AM   Next Dose Due:  Saturday August 3rd in the Morning   Notes to Patient:  Take this Medication Once a Day            Take this Medication in the Morning

## 2018-08-01 NOTE — ED NOTES
With in-person interpretor, RN assessed [facility] Suicide Screen. Patient scored Orange for previous suicide attempt 5 years ago after father passed away and wife left him. Denies any current S/I or stressors. Dr Eddy notified.

## 2018-08-01 NOTE — ED NOTES
Bed: ST01  Expected date:   Expected time:   Means of arrival:   Comments:  518 432M sob cpap  ETA 8min

## 2018-08-01 NOTE — PHARMACY-ADMISSION MEDICATION HISTORY
"Admission medication history interview status for the 8/1/2018  admission is complete. See EPIC admission navigator for prior to admission medications     Medication history source reliability:Good    Actions taken by pharmacist (provider contacted, etc):None     Additional medication history information not noted on PTA med list :None    Medication reconciliation/reorder completed by provider prior to medication history? No    Time spent in this activity: 15\"    Prior to Admission medications    Medication Sig Last Dose Taking? Auth Provider   ACETAMINOPHEN PO Take 1,000 mg by mouth 2 times daily as needed for pain  8/1/2018 at am Yes Unknown, Entered By History   albuterol (PROAIR HFA/PROVENTIL HFA/VENTOLIN HFA) 108 (90 Base) MCG/ACT Inhaler Inhale 1-2 puffs into the lungs every 4 hours as needed for shortness of breath / dyspnea or wheezing prn Yes Unknown, Entered By History   Atorvastatin Calcium (LIPITOR PO) Take 20 mg by mouth At Bedtime 7/31/2018 at hs Yes Unknown, Entered By History   loratadine (CLARITIN) 10 MG tablet Take 10 mg by mouth every morning 8/1/2018 at am Yes Unknown, Entered By History   mometasone (NASONEX) 50 MCG/ACT spray Spray 2 sprays into both nostrils 2 times daily  8/1/2018 at am Yes Unknown, Entered By History   OMEPRAZOLE PO Take 20 mg by mouth every morning 8/1/2018 at am Yes Unknown, Entered By History   testosterone cypionate (DEPOTESTOTERONE) 200 MG/ML injection Inject 200 mg into the muscle every 14 days 7/20/2018 at am Yes Unknown, Entered By History   VITAMIN D, CHOLECALCIFEROL, PO Take 1,000 Units by mouth every morning 8/1/2018 at am Yes Unknown, Entered By History         "

## 2018-08-01 NOTE — IP AVS SNAPSHOT
[facility]    [address]    [place] [address]    Phone:  [phone]                                       After Visit Summary   8/1/2018    Chandana Gómez    MRN: [id number]           After Visit Summary Signature Page     I have received my discharge instructions, and my questions have been answered. I have discussed any challenges I see with this plan with the nurse or doctor.    ..........................................................................................................................................  Patient/Patient Representative Signature      ..........................................................................................................................................  Patient Representative Print Name and Relationship to Patient    ..................................................               ................................................  Date                                            Time    ..........................................................................................................................................  Reviewed by Signature/Title    ...................................................              ..............................................  Date                                                            Time

## 2018-08-02 LAB
BASE EXCESS BLDV CALC-SCNC: 0 MMOL/L
GLUCOSE BLDC GLUCOMTR-MCNC: 203 MG/DL (ref 70–99)
GLUCOSE BLDC GLUCOMTR-MCNC: 211 MG/DL (ref 70–99)
GLUCOSE BLDC GLUCOMTR-MCNC: 256 MG/DL (ref 70–99)
GLUCOSE BLDC GLUCOMTR-MCNC: 262 MG/DL (ref 70–99)
HCO3 BLDV-SCNC: 24 MMOL/L (ref 21–28)
LACTATE BLD-SCNC: 3.1 MMOL/L (ref 0.7–2)
LACTATE BLD-SCNC: 3.5 MMOL/L (ref 0.7–2)
OXYHGB MFR BLDV: 94 %
PCO2 BLDV: 35 MM HG (ref 40–50)
PH BLDV: 7.44 PH (ref 7.32–7.43)
PO2 BLDV: 63 MM HG (ref 25–47)

## 2018-08-02 PROCEDURE — 00000146 ZZHCL STATISTIC GLUCOSE BY METER IP

## 2018-08-02 PROCEDURE — 83605 ASSAY OF LACTIC ACID: CPT | Performed by: HOSPITALIST

## 2018-08-02 PROCEDURE — 25000132 ZZH RX MED GY IP 250 OP 250 PS 637: Performed by: INTERNAL MEDICINE

## 2018-08-02 PROCEDURE — 82805 BLOOD GASES W/O2 SATURATION: CPT | Performed by: INTERNAL MEDICINE

## 2018-08-02 PROCEDURE — 99233 SBSQ HOSP IP/OBS HIGH 50: CPT | Performed by: HOSPITALIST

## 2018-08-02 PROCEDURE — 94640 AIRWAY INHALATION TREATMENT: CPT | Mod: 76

## 2018-08-02 PROCEDURE — 87040 BLOOD CULTURE FOR BACTERIA: CPT | Performed by: HOSPITALIST

## 2018-08-02 PROCEDURE — 36415 COLL VENOUS BLD VENIPUNCTURE: CPT | Performed by: HOSPITALIST

## 2018-08-02 PROCEDURE — 21000000 ZZH R&B IMCU HEART CARE

## 2018-08-02 PROCEDURE — 25000128 H RX IP 250 OP 636: Performed by: INTERNAL MEDICINE

## 2018-08-02 PROCEDURE — 25000128 H RX IP 250 OP 636: Performed by: HOSPITALIST

## 2018-08-02 PROCEDURE — 25000125 ZZHC RX 250: Performed by: INTERNAL MEDICINE

## 2018-08-02 PROCEDURE — 36415 COLL VENOUS BLD VENIPUNCTURE: CPT | Performed by: INTERNAL MEDICINE

## 2018-08-02 PROCEDURE — 93010 ELECTROCARDIOGRAM REPORT: CPT | Performed by: INTERNAL MEDICINE

## 2018-08-02 PROCEDURE — 40000809 ZZH STATISTIC NO DOCUMENTATION TO SUPPORT CHARGE: Mod: 76

## 2018-08-02 PROCEDURE — 40000275 ZZH STATISTIC RCP TIME EA 10 MIN

## 2018-08-02 PROCEDURE — 94640 AIRWAY INHALATION TREATMENT: CPT

## 2018-08-02 PROCEDURE — 93005 ELECTROCARDIOGRAM TRACING: CPT

## 2018-08-02 RX ORDER — SODIUM CHLORIDE AND POTASSIUM CHLORIDE 150; 450 MG/100ML; MG/100ML
INJECTION, SOLUTION INTRAVENOUS CONTINUOUS
Status: DISCONTINUED | OUTPATIENT
Start: 2018-08-02 | End: 2018-08-03 | Stop reason: HOSPADM

## 2018-08-02 RX ORDER — LEVOFLOXACIN 5 MG/ML
500 INJECTION, SOLUTION INTRAVENOUS EVERY 24 HOURS
Status: DISCONTINUED | OUTPATIENT
Start: 2018-08-02 | End: 2018-08-03 | Stop reason: HOSPADM

## 2018-08-02 RX ADMIN — IPRATROPIUM BROMIDE AND ALBUTEROL SULFATE 3 ML: .5; 3 SOLUTION RESPIRATORY (INHALATION) at 22:49

## 2018-08-02 RX ADMIN — ALBUTEROL SULFATE 2.5 MG: 2.5 SOLUTION RESPIRATORY (INHALATION) at 05:36

## 2018-08-02 RX ADMIN — METHYLPREDNISOLONE SODIUM SUCCINATE 62.5 MG: 125 INJECTION, POWDER, FOR SOLUTION INTRAMUSCULAR; INTRAVENOUS at 23:47

## 2018-08-02 RX ADMIN — METHYLPREDNISOLONE SODIUM SUCCINATE 62.5 MG: 125 INJECTION, POWDER, FOR SOLUTION INTRAMUSCULAR; INTRAVENOUS at 12:09

## 2018-08-02 RX ADMIN — LEVOFLOXACIN 500 MG: 5 INJECTION, SOLUTION INTRAVENOUS at 18:49

## 2018-08-02 RX ADMIN — INSULIN ASPART 2 UNITS: 100 INJECTION, SOLUTION INTRAVENOUS; SUBCUTANEOUS at 17:07

## 2018-08-02 RX ADMIN — LORATADINE 10 MG: 10 TABLET ORAL at 10:01

## 2018-08-02 RX ADMIN — ALBUTEROL SULFATE 2 PUFF: 90 AEROSOL, METERED RESPIRATORY (INHALATION) at 09:59

## 2018-08-02 RX ADMIN — METHYLPREDNISOLONE SODIUM SUCCINATE 62.5 MG: 125 INJECTION, POWDER, FOR SOLUTION INTRAMUSCULAR; INTRAVENOUS at 18:44

## 2018-08-02 RX ADMIN — IPRATROPIUM BROMIDE AND ALBUTEROL SULFATE 3 ML: .5; 3 SOLUTION RESPIRATORY (INHALATION) at 07:09

## 2018-08-02 RX ADMIN — METHYLPREDNISOLONE SODIUM SUCCINATE 62.5 MG: 125 INJECTION, POWDER, FOR SOLUTION INTRAMUSCULAR; INTRAVENOUS at 00:50

## 2018-08-02 RX ADMIN — SODIUM CHLORIDE 250 ML: 9 INJECTION, SOLUTION INTRAVENOUS at 19:42

## 2018-08-02 RX ADMIN — VITAMIN D, TAB 1000IU (100/BT) 1000 UNITS: 25 TAB at 10:01

## 2018-08-02 RX ADMIN — AZITHROMYCIN MONOHYDRATE 250 MG: 250 TABLET ORAL at 00:49

## 2018-08-02 RX ADMIN — FLUTICASONE PROPIONATE 2 SPRAY: 50 SPRAY, METERED NASAL at 10:00

## 2018-08-02 RX ADMIN — IPRATROPIUM BROMIDE AND ALBUTEROL SULFATE 3 ML: .5; 3 SOLUTION RESPIRATORY (INHALATION) at 15:21

## 2018-08-02 RX ADMIN — SODIUM CHLORIDE AND POTASSIUM CHLORIDE: 4.5; 1.49 INJECTION, SOLUTION INTRAVENOUS at 19:42

## 2018-08-02 RX ADMIN — OMEPRAZOLE 20 MG: 20 CAPSULE, DELAYED RELEASE ORAL at 10:01

## 2018-08-02 RX ADMIN — AZITHROMYCIN MONOHYDRATE 500 MG: 250 TABLET ORAL at 00:50

## 2018-08-02 RX ADMIN — IPRATROPIUM BROMIDE AND ALBUTEROL SULFATE 3 ML: .5; 3 SOLUTION RESPIRATORY (INHALATION) at 11:44

## 2018-08-02 RX ADMIN — ACETAMINOPHEN 650 MG: 325 TABLET, FILM COATED ORAL at 16:16

## 2018-08-02 RX ADMIN — INSULIN ASPART 2 UNITS: 100 INJECTION, SOLUTION INTRAVENOUS; SUBCUTANEOUS at 10:05

## 2018-08-02 RX ADMIN — METHYLPREDNISOLONE SODIUM SUCCINATE 62.5 MG: 125 INJECTION, POWDER, FOR SOLUTION INTRAMUSCULAR; INTRAVENOUS at 07:06

## 2018-08-02 RX ADMIN — ATORVASTATIN CALCIUM 20 MG: 20 TABLET, FILM COATED ORAL at 23:45

## 2018-08-02 RX ADMIN — IPRATROPIUM BROMIDE AND ALBUTEROL SULFATE 3 ML: .5; 3 SOLUTION RESPIRATORY (INHALATION) at 19:34

## 2018-08-02 RX ADMIN — ENOXAPARIN SODIUM 40 MG: 40 INJECTION SUBCUTANEOUS at 18:44

## 2018-08-02 RX ADMIN — INSULIN ASPART 3 UNITS: 100 INJECTION, SOLUTION INTRAVENOUS; SUBCUTANEOUS at 11:58

## 2018-08-02 RX ADMIN — ALBUTEROL SULFATE 2 PUFF: 90 AEROSOL, METERED RESPIRATORY (INHALATION) at 19:21

## 2018-08-02 ASSESSMENT — PAIN DESCRIPTION - DESCRIPTORS: DESCRIPTORS: HEADACHE

## 2018-08-02 NOTE — CONSULTS
Consult Date:  2018      HISTORY OF PRESENT ILLNESS:  The patient is a 43-year-old Prydeinig-speaking man who I have helped with an ,  who was generally followed at [facility].  He has a history of Klinefelter's syndrome and has had mild persistent asthma with sinusitis in the past, nasal polyps evidently by Care Everywhere chart.  He may have had surgical treatment for this.  The patient had an acute exacerbation of his asthma and presented with rapidly progressive shortness of breath and cough following an upper respiratory infection.  He was not aware of wheezing.  He had mild sputum production, but had chest tightness and cough.  Initial blood gas showed a pH of 7.31, pCO2 of 52.  Chest x-ray was negative.  He was given steroids, antibiotics, Singulair,  loratadine, DuoNebs and he is markedly improved.  He has some hyperglycemia as well. He has underlying diabetes.  He has had gastroesophageal reflux controlled with PPIs. He does not believe his sinusitis is active at this time.  He has chronic rhinitis.  His chest x-ray was clear.  He has not had a CT of his chest.  He has had abnormal PFTs in the past he says, however, those documents were unavailable on the computer.  He was getting a cold 1 week ago with sore throat, congestion,  mild headache and cough. He had green phlegm at that time. He had no other symptoms. In the ER, his temperature was 99.2, heart rate 114, blood pressure 150/118, respiratory rates were 24. The venous blood gas showed CO2 of 52, pH of 7.31.  Chest x-ray is unremarkable.        SOCIAL HISTORY:  Denies smoking.  Does not drink.  Lives with his brother, now lives in [place].      FAMILY HISTORY:  Mother  of cancer.  He has had no immunizations.      ALLERGIES:  ASPIRIN, IBUPROFEN AND NAPROXEN.       MEDICATIONS  PRIOR TO ADMISSION:  Acetaminophen, Ventolin, Lipitor, Nasonex, Claritin, testosterone.      REVIEW OF SYSTEMS:  Other 10-point review  of systems is  negative.      PHYSICAL EXAMINATION:  Today, the patient has changes of Klinefelter's.  He is afebrile, heart rate 100, blood pressure 114/105, respiratory rate was 15 not labored.  O2 sats were 98%.     HEENT:  Mallampati IV, thick neck.  No adenopathy or JVD, carotids intact.   CHEST:  Clear to auscultation and percussion.  No wheezes or rhonchi.   EXTREMITIES:  No cyanosis, clubbing, edema.   PULMONARY HISTORY: Mild persistent asthma with acute exacerbation, recurrent sinusitis, and NSAID allergy.       ASSESSMENT:  At this time, the patient is improving significantly.  I would recommend that the patient be placed on a rapid steroid taper and continue Singulair and the antihistamine.  I would give the patient a long-acting beta agonist with steroids such as Symbicort 160/4.5 two puffs twice a day or equivalent or Breo 200 mcg 1 puff daily. Ventolin inhaler p.r.n. rescue and return to our clinic.  I gave him contact information. My associate, Dr. May, is fluent in Cameroonian.  I do not think he needs a CT scan of his chest at this time and we will follow p.r.n.         KAEL LEAL MD             D: 2018   T: 2018   MT: VASILIY      Name:     ADA ARTHUR   MRN:      [id number]        Account:       [id number]   :      1975           Consult Date:  2018      Document: [id number]

## 2018-08-02 NOTE — PLAN OF CARE
Problem: Patient Care Overview  Goal: Plan of Care/Patient Progress Review  Outcome: No Change  Pt stable over night, VSS, no c/o pain, Pt c/o SOB which was treated with PRN nebs with good results.  Pt is able to maintain O2 sat's on NC at 4L, LS are decreased and at times wheezy.  Pt continuing current treatment, no new issues.

## 2018-08-02 NOTE — CONSULTS
Pt seen, see consult. Asthma exacerbation, treat with steroid taper Breo 200 mcg 1 puff daily and rescue inhaler, continue singulair and Claritin. Could substitute Symbicort for Breo if needed. RTC 1-2 month for follow up and sleep evaluation. Pt snores. Contact info given when  present.    Richmond Gamboa MD  [facility]  [phone]

## 2018-08-02 NOTE — PROGRESS NOTES
[facility]  Hospitalist Progress Note        Pietro Yenny Roderick, DO  08/02/2018        Interval History:      Patient states that he is doing well. Discussed plan of care with assistance from , patient verbalized understanding.          Assessment and Plan:        Chandana Gómez is a 43 year old Kuwaiti speaking male with history of triad of nasal polyposis, NSAID allergy and mild persistent asthma diagnosed approximately 4 years ago through [facility] where he had spirometry (8/2011, results not available through care everywhere). He also has a history of Klinefelter syndrome which predisposes him to chronic bronchitis, emphysema and bronchiectasis  Up to this point he has never been hospitalized for asthma and is only had mild exacerbations requiring p.r.n. Albuterol and occasional steroids. He presented on August 1 after developing progressive shortness of breath, cough following an upper respiratory infection, wioth acute shortness of breath and disorientation at work.       Acute hypercapnic respiratory failure  Acute exacerbation of asthma   Suspect obstructive sleep apnea/obesity hypoventilation syndrome  - Continue steroids  - Continue prior to admission Singulair, loratadine  - Continue duo nebs q.4 hours while awake albuterol nebs p.r.n.  - BiPAP prn  - RCAT      Mild hyperglycemia (141) obesity A1c is 6.3.   - Follow blood sugars    - Medium sliding scale      Klinefelter's syndrome - this predisposes him to emphysema, chronic orchitis, bronchiectasis. Spirometry done in 2011 through [facility] is not available to care everywhere  - Pulmonary consulted      GERD  - PTA PPI     Chronic rhinitis  - Continue prior to admission Nasonex  - Continue loratadine     Hyperlipidemia:   - Continue PTA atorvastatin     DVT prophylaxis lovenox      Code Full    Disposition: Pending weaning from oxygen, may transfer out of [facility] today if stable. Likely 1-3 days.     Pain: # Pain  "Assessment:  Current Pain Score 2018   Patient currently in pain? denies   Pain score (0-10) -   Pain location -   Pain descriptors -   Jose pain level was assessed and he currently denies pain.                     Physical Exam:      Heart Rate: 100    Blood pressure 124/67, pulse 114, temperature 98.6  F (37  C), temperature source Oral, resp. rate 13, height 1.753 m (5' 9\"), weight 116.9 kg (257 lb 11.5 oz), SpO2 96 %.    Vitals:    18 1335 18 0600   Weight: 118.4 kg (261 lb 0.4 oz) 116.9 kg (257 lb 11.5 oz)       Vital Sign Ranges  Temperature Temp  Av.9  F (37.2  C)  Min: 98.6  F (37  C)  Max: 99.2  F (37.3  C)   Blood pressure Systolic (24hrs), Av , Min:115 , Max:163        Diastolic (24hrs), Av, Min:67, Max:118      Pulse Pulse  Av  Min: 114  Max: 114   Respirations Resp  Avg: 15.3  Min: 11  Max: 24   Pulse oximetry SpO2  Av.8 %  Min: 96 %  Max: 100 %     Vital Signs with Ranges  Temp:  [98.6  F (37  C)-99.2  F (37.3  C)] 98.6  F (37  C)  Pulse:  [114] 114  Heart Rate:  [] 100  Resp:  [11-24] 13  BP: (115-163)/() 124/67  FiO2 (%):  [60 %-80 %] 60 %  SpO2:  [96 %-100 %] 96 %    I/O Last 3 Shifts:   I/O last 3 completed shifts:  In: 150 [P.O.:150]  Out: 1350 [Urine:1350]    I/O past 24 hours:     Intake/Output Summary (Last 24 hours) at 18 0753  Last data filed at 18 0600   Gross per 24 hour   Intake              150 ml   Output             1350 ml   Net            -1200 ml     GENERAL: Alert and oriented. NAD. Conversational, appropriate.   HEENT: Normocephalic. EOMI. No icterus or injection. Nares normal.   LUNGS: Coarse with wheezing. No dyspnea at rest.   HEART: Regular rate. Extremities perfused.   ABDOMEN: Soft, nontender, and nondistended. Positive bowel sounds.   EXTREMITIES: No LE edema noted.   NEUROLOGIC: Moves extremities x4 on command. No acute focal neurologic abnormalities noted.          Prior to Admission Medications:    "     Prescriptions Prior to Admission   Medication Sig Dispense Refill Last Dose     ACETAMINOPHEN PO Take 1,000 mg by mouth 2 times daily as needed for pain    8/1/2018 at am     albuterol (PROAIR HFA/PROVENTIL HFA/VENTOLIN HFA) 108 (90 Base) MCG/ACT Inhaler Inhale 1-2 puffs into the lungs every 4 hours as needed for shortness of breath / dyspnea or wheezing   prn     Atorvastatin Calcium (LIPITOR PO) Take 20 mg by mouth At Bedtime   7/31/2018 at hs     loratadine (CLARITIN) 10 MG tablet Take 10 mg by mouth every morning   8/1/2018 at am     mometasone (NASONEX) 50 MCG/ACT spray Spray 2 sprays into both nostrils 2 times daily    8/1/2018 at am     OMEPRAZOLE PO Take 20 mg by mouth every morning   8/1/2018 at am     testosterone cypionate (DEPOTESTOTERONE) 200 MG/ML injection Inject 200 mg into the muscle every 14 days   7/20/2018 at am     VITAMIN D, CHOLECALCIFEROL, PO Take 1,000 Units by mouth every morning   8/1/2018 at am            Medications:        Current Facility-Administered Medications   Medication Last Rate     IV infusion builder WITH additives Stopped (08/02/18 0451)     - MEDICATION INSTRUCTIONS -       - MEDICATION INSTRUCTIONS -       Current Facility-Administered Medications   Medication Dose Route Frequency     albuterol  3 mL Nebulization Q4H     atorvastatin (LIPITOR) tablet 20 mg  20 mg Oral At Bedtime     azithromycin  250 mg Oral QPM     cholecalciferol  1,000 Units Oral Daily     enoxaparin  40 mg Subcutaneous Q24H     fluticasone  2 spray Both Nostrils Daily     insulin aspart  1-7 Units Subcutaneous TID AC     insulin aspart  1-5 Units Subcutaneous At Bedtime     ipratropium - albuterol 0.5 mg/2.5 mg/3 mL  3 mL Nebulization Q4H While awake     loratadine  10 mg Oral QAM     methylPREDNISolone  62.5 mg Intravenous Q6H     omeprazole (priLOSEC) CR capsule 20 mg  20 mg Oral QAM     Current Facility-Administered Medications   Medication Dose Route Frequency     acetaminophen  650 mg Oral Q4H  PRN     albuterol  1-2 puff Inhalation Q4H PRN     albuterol  3 mL Nebulization Q2H PRN     bisacodyl  10 mg Rectal Daily PRN     glucose  15-30 g Oral Q15 Min PRN    Or     dextrose  25-50 mL Intravenous Q15 Min PRN    Or     glucagon  1 mg Subcutaneous Q15 Min PRN     magnesium sulfate  4 g Intravenous Q4H PRN     melatonin  1 mg Oral At Bedtime PRN     naloxone  0.1-0.4 mg Intravenous Q2 Min PRN     - MEDICATION INSTRUCTIONS -   Does not apply Continuous PRN     ondansetron  4 mg Oral Q6H PRN    Or     ondansetron  4 mg Intravenous Q6H PRN     - MEDICATION INSTRUCTIONS -   Does not apply Continuous PRN     polyethylene glycol  17 g Oral Daily PRN     potassium chloride  20-40 mEq Oral or Feeding Tube Q2H PRN     potassium chloride with lidocaine  10 mEq Intravenous Q1H PRN     potassium chloride  10 mEq Intravenous Q1H PRN     potassium chloride  20 mEq Intravenous Q1H PRN     potassium chloride  20-40 mEq Oral Q2H PRN     prochlorperazine  10 mg Intravenous Q6H PRN    Or     prochlorperazine  10 mg Oral Q6H PRN    Or     prochlorperazine  25 mg Rectal Q12H PRN     senna-docusate  1 tablet Oral BID PRN    Or     senna-docusate  2 tablet Oral BID PRN            Data:      Lab data reviewed.     Recent Labs  Lab 08/01/18  1340   HGB 15.6   MCV 87         POTASSIUM 3.6   CHLORIDE 107   CO2 26   BUN 10   CR 0.73   ANIONGAP 7   CLAIR 8.3*   *   TROPI <0.015           Imaging:      Imaging data reviewed.     Dr. Pietro Vaughn D.O.  [facility]  Pager [phone]

## 2018-08-02 NOTE — PROGRESS NOTES
[facility]    Sepsis Evaluation Progress Note    Date of Service: 08/02/2018    I was called to see Chandana Gómez due to abnormal vital signs triggering the Sepsis SIRS screening alert. He is known to have an infection.     Physical Exam    Vital Signs:  Temp: 96.9  F (36.1  C) Temp src: Axillary BP: 144/77   Heart Rate: 111 Resp: 15 SpO2: 97 % O2 Device: Nasal cannula Oxygen Delivery: 1 LPM    Lab:  Lactate for Sepsis Protocol   Date Value Ref Range Status   08/02/2018 3.1 (H) 0.7 - 2.0 mmol/L Final     Comment:     Significant value called to and read back by  LUKE SHULTZ [facility] AT 1744 ON [id number] DB         The patient is at baseline mental status.    The rest of their physical exam is significant for tachycardia.    Assessment and Plan    The SIRS and exam findings are likely due to   sepsis.     ID: The patient is currently on the following antibiotics:  Anti-infectives (Future)    Start     Dose/Rate Route Frequency Ordered Stop    08/02/18 1800  levofloxacin (LEVAQUIN) infusion 500 mg     Comments:  Pharmacy can adjust dose based on renal function.    500 mg  100 mL/hr over 60 Minutes Intravenous EVERY 24 HOURS 08/02/18 1759          Current antibiotic coverage requires additional antibiotics for pulmonary source.    Fluid: Fluid bolus ordered.  Limited fluid bolus due to pulmonary disease.   Lab: Repeat lactic acid ordered for 2 hours from now.    Disposition: The patient will remain on the current unit. We will continue to monitor this patient closely.    Pietro Vaughn DO

## 2018-08-02 NOTE — PROVIDER NOTIFICATION
Critical lab was called for LA 3.1 to Dr. Vaughn (BPA for sepsis triggered by HR >110). Aeaiting orders, will continue to assess and monitor

## 2018-08-02 NOTE — PLAN OF CARE
Problem: Patient Care Overview  Goal: Plan of Care/Patient Progress Review  Outcome: Improving  Pt has been pain free with V/signs stable and maintaining his O2 in the mid 90's on 2 L per N/C. Blood sugar have been elevated secondary to receiving sol medrol every 6 hours and is receiving Sliding scale insulin coverage. Tolerating decrease in O2 (2L) since 14:00. Remains in Sinus Rhythm/ Sinus Tach

## 2018-08-02 NOTE — PLAN OF CARE
Problem: Patient Care Overview  Goal: Plan of Care/Patient Progress Review  Outcome: Improving  Patient A&O x4, Swedish speaking and  available, Denies chest pain/tightness/pressure and SOB improving with duo nebs and Carrie- medrol on tele SR and VSS,  Lungs are clear with bases diminished and on 4L NC, up with SBA , on Regular diet and BG checks due to IV steroids, skin is WNL , voiding well and last BM 8/1. IV is in L and R arm and 0.45 % NS with KCL 20 MEq infusing at 100 ml/hr (to SL after 1L bag). Plan for continue to monitoring.

## 2018-08-03 VITALS
BODY MASS INDEX: 38.06 KG/M2 | TEMPERATURE: 97.8 F | OXYGEN SATURATION: 96 % | HEIGHT: 69 IN | RESPIRATION RATE: 16 BRPM | WEIGHT: 257 LBS | HEART RATE: 114 BPM | SYSTOLIC BLOOD PRESSURE: 132 MMHG | DIASTOLIC BLOOD PRESSURE: 73 MMHG

## 2018-08-03 LAB
ANION GAP SERPL CALCULATED.3IONS-SCNC: 8 MMOL/L (ref 3–14)
BUN SERPL-MCNC: 17 MG/DL (ref 7–30)
CALCIUM SERPL-MCNC: 8.1 MG/DL (ref 8.5–10.1)
CHLORIDE SERPL-SCNC: 108 MMOL/L (ref 94–109)
CO2 SERPL-SCNC: 24 MMOL/L (ref 20–32)
CREAT SERPL-MCNC: 0.65 MG/DL (ref 0.66–1.25)
ERYTHROCYTE [DISTWIDTH] IN BLOOD BY AUTOMATED COUNT: 13.7 % (ref 10–15)
GFR SERPL CREATININE-BSD FRML MDRD: >90 ML/MIN/1.7M2
GLUCOSE BLDC GLUCOMTR-MCNC: 158 MG/DL (ref 70–99)
GLUCOSE BLDC GLUCOMTR-MCNC: 204 MG/DL (ref 70–99)
GLUCOSE BLDC GLUCOMTR-MCNC: 369 MG/DL (ref 70–99)
GLUCOSE SERPL-MCNC: 185 MG/DL (ref 70–99)
HCT VFR BLD AUTO: 40.4 % (ref 40–53)
HGB BLD-MCNC: 13.9 G/DL (ref 13.3–17.7)
MCH RBC QN AUTO: 30.1 PG (ref 26.5–33)
MCHC RBC AUTO-ENTMCNC: 34.4 G/DL (ref 31.5–36.5)
MCV RBC AUTO: 87 FL (ref 78–100)
PLATELET # BLD AUTO: 231 10E9/L (ref 150–450)
POTASSIUM SERPL-SCNC: 4 MMOL/L (ref 3.4–5.3)
RBC # BLD AUTO: 4.62 10E12/L (ref 4.4–5.9)
SODIUM SERPL-SCNC: 140 MMOL/L (ref 133–144)
WBC # BLD AUTO: 17.2 10E9/L (ref 4–11)

## 2018-08-03 PROCEDURE — 25000128 H RX IP 250 OP 636: Performed by: INTERNAL MEDICINE

## 2018-08-03 PROCEDURE — 80048 BASIC METABOLIC PNL TOTAL CA: CPT | Performed by: HOSPITALIST

## 2018-08-03 PROCEDURE — 25000125 ZZHC RX 250: Performed by: INTERNAL MEDICINE

## 2018-08-03 PROCEDURE — 99239 HOSP IP/OBS DSCHRG MGMT >30: CPT | Performed by: HOSPITALIST

## 2018-08-03 PROCEDURE — 36415 COLL VENOUS BLD VENIPUNCTURE: CPT | Performed by: HOSPITALIST

## 2018-08-03 PROCEDURE — 94640 AIRWAY INHALATION TREATMENT: CPT

## 2018-08-03 PROCEDURE — 94640 AIRWAY INHALATION TREATMENT: CPT | Mod: 76

## 2018-08-03 PROCEDURE — 00000146 ZZHCL STATISTIC GLUCOSE BY METER IP

## 2018-08-03 PROCEDURE — 40000275 ZZH STATISTIC RCP TIME EA 10 MIN

## 2018-08-03 PROCEDURE — 25000132 ZZH RX MED GY IP 250 OP 250 PS 637: Performed by: INTERNAL MEDICINE

## 2018-08-03 PROCEDURE — 85027 COMPLETE CBC AUTOMATED: CPT | Performed by: HOSPITALIST

## 2018-08-03 RX ORDER — LEVOFLOXACIN 500 MG/1
500 TABLET, FILM COATED ORAL DAILY
Qty: 5 TABLET | Refills: 0 | Status: SHIPPED | OUTPATIENT
Start: 2018-08-03 | End: 2018-08-08

## 2018-08-03 RX ORDER — PREDNISONE 10 MG/1
TABLET ORAL
Qty: 30 TABLET | Refills: 0 | Status: SHIPPED | OUTPATIENT
Start: 2018-08-03

## 2018-08-03 RX ADMIN — IPRATROPIUM BROMIDE AND ALBUTEROL SULFATE 3 ML: .5; 3 SOLUTION RESPIRATORY (INHALATION) at 10:59

## 2018-08-03 RX ADMIN — ALBUTEROL SULFATE 2.5 MG: 2.5 SOLUTION RESPIRATORY (INHALATION) at 03:24

## 2018-08-03 RX ADMIN — INSULIN ASPART 1 UNITS: 100 INJECTION, SOLUTION INTRAVENOUS; SUBCUTANEOUS at 09:00

## 2018-08-03 RX ADMIN — METHYLPREDNISOLONE SODIUM SUCCINATE 62.5 MG: 125 INJECTION, POWDER, FOR SOLUTION INTRAMUSCULAR; INTRAVENOUS at 06:16

## 2018-08-03 RX ADMIN — INSULIN ASPART 5 UNITS: 100 INJECTION, SOLUTION INTRAVENOUS; SUBCUTANEOUS at 13:02

## 2018-08-03 RX ADMIN — IPRATROPIUM BROMIDE AND ALBUTEROL SULFATE 3 ML: .5; 3 SOLUTION RESPIRATORY (INHALATION) at 07:49

## 2018-08-03 RX ADMIN — VITAMIN D, TAB 1000IU (100/BT) 1000 UNITS: 25 TAB at 09:02

## 2018-08-03 RX ADMIN — ALBUTEROL SULFATE 2 PUFF: 90 AEROSOL, METERED RESPIRATORY (INHALATION) at 09:03

## 2018-08-03 RX ADMIN — FLUTICASONE PROPIONATE 2 SPRAY: 50 SPRAY, METERED NASAL at 09:02

## 2018-08-03 RX ADMIN — OMEPRAZOLE 20 MG: 20 CAPSULE, DELAYED RELEASE ORAL at 09:02

## 2018-08-03 RX ADMIN — METHYLPREDNISOLONE SODIUM SUCCINATE 62.5 MG: 125 INJECTION, POWDER, FOR SOLUTION INTRAMUSCULAR; INTRAVENOUS at 13:02

## 2018-08-03 RX ADMIN — ACETAMINOPHEN 650 MG: 325 TABLET, FILM COATED ORAL at 09:02

## 2018-08-03 RX ADMIN — LORATADINE 10 MG: 10 TABLET ORAL at 09:02

## 2018-08-03 ASSESSMENT — PAIN DESCRIPTION - DESCRIPTORS: DESCRIPTORS: HEADACHE

## 2018-08-03 NOTE — PLAN OF CARE
Problem: Asthma (Adult)  Goal: Signs and Symptoms of Listed Potential Problems Will be Absent, Minimized or Managed (Asthma)  Signs and symptoms of listed potential problems will be absent, minimized or managed by discharge/transition of care (reference Asthma (Adult) CPG).   Outcome: Improving  Patient received alert and oriented, denies respiratory distress, receiving respiratory treatment ( inhaled steroid) as ordered, VS stable, ST/monitor.

## 2018-08-03 NOTE — DISCHARGE SUMMARY
"[facility]    Discharge Summary  Hospitalist    Date of Admission:  8/1/2018  Date of Discharge:  8/3/2018  Discharging Provider: Pietro Vaughn  Date of Service (when I saw the patient): 08/03/18    History of Present Illness   Chandana Gómez is a 43 year old male with history of nasal polyposis, NSAID allergy and mild persistent asthma diagnosed approximately 4 years ago through [facility] when he had spirometry (8/2011, results not available through care everywhere). He also has a history of Klinefelter syndrome which predisposes him to chronic bronchitis, emphysema and bronchiectasis  Up to this point he has never been hospitalized for asthma and is only had mild exacerbations requiring p.r.n. Albuterol and occasional steroids. He reports getting a cold approximately one week ago which caused a sore throat congestion, mild headache and cough.  He reports coughing up green sputum.  He denies any fevers or chest pain, no lower extremity swelling or pain. He reports little relief with his inhaler he only uses albuterol. 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  This his afternoon at lunch he became acutely more short of breath and got disoriented. Coworkers called EMS when he appeared in distress and \"purple.\" They report acute respiratory distress With oxygen saturations around 83-84% with little air movement.  EMS administered:  - 125 ml of Solu-Medrol,   - 2 g of magnesium, and   - 1 DuoNeb.   After DuoNeb, his saturation went to 93.      At presentation to ED his temperature is 99.2.  Heart rate 114, blood pressure 150/118 respirations 24 oxygenation 100% on BiPAP. BiPAP was discontinued during his ED ED stay and he was able to complete full sentences without significant respiratory distressHere in the ED, the patient reports 1 week of shortness of breath with minor relief with inhalers.      VBG reveals some mild respiratory acidosis With a pH of 7.31, CO2 of 52.   CBC, " complete metabolic panel and troponin are unremarkable.  Glucose 141. Chest x-ray reviewed by myself reveals no infiltrates EKG reviewed reveals normal sinus rhythm is unremarkable    Hospital Course   Chandana Gómez was admitted on 8/1/2018.  The following problems were addressed during his hospitalization:    Chandana Gómez is a 43 year old Czech speaking male with history of triad of nasal polyposis, NSAID allergy and mild persistent asthma diagnosed approximately 4 years ago through [facility] where he had spirometry (8/2011, results not available through care everywhere). He also has a history of Klinefelter syndrome which predisposes him to chronic bronchitis, emphysema and bronchiectasis  Up to this point he has never been hospitalized for asthma and is only had mild exacerbations requiring p.r.n. Albuterol and occasional steroids. He presented on August 1 after developing progressive shortness of breath, cough following an upper respiratory infection, wioth acute shortness of breath and disorientation at work.        Acute hypercapnic respiratory failure  Acute exacerbation of asthma   Suspect obstructive sleep apnea/obesity hypoventilation syndrome  - Continue steroid taper.   - Close outpatient follow up.   - Pulmonary regimen and hygiene.   - Continue prior to admission Singulair, loratadine      Mild hyperglycemia (141) obesity A1c is 6.3.   - Follow as outpatient.       Klinefelter's syndrome - this predisposes him to emphysema, chronic orchitis, bronchiectasis. Spirometry done in 2011 through [facility] is not available to care everywhere  - Close outpatient follow up.       GERD  - PTA PPI      Chronic rhinitis  - Continue prior to admission Nasonex  - Continue loratadine      Hyperlipidemia:   - Continue PTA atorvastatin      Pending Results   These results will be followed up by PCP  Unresulted Labs Ordered in the Past 30 Days of this Admission     Date and Time Order Name Status Description     8/2/2018 1758 Blood culture Preliminary     8/2/2018 1758 Blood culture Preliminary           Code Status   Full Code       Primary Care Physician   [facility]    Physical Exam   Temp: 97.6  F (36.4  C) Temp src: Oral BP: 130/78   Heart Rate: 110 Resp: 10 SpO2: 95 % O2 Device: None (Room air) Oxygen Delivery: 1 LPM  Vitals:    08/01/18 1335 08/02/18 0600 08/03/18 0615   Weight: 118.4 kg (261 lb 0.4 oz) 116.9 kg (257 lb 11.5 oz) 116.6 kg (257 lb)     Vital Signs with Ranges  Temp:  [96.9  F (36.1  C)-98.6  F (37  C)] 97.6  F (36.4  C)  Heart Rate:  [100-129] 110  Resp:  [10-22] 10  BP: (112-144)/(60-79) 130/78  SpO2:  [94 %-100 %] 95 %  I/O last 3 completed shifts:  In: -   Out: 815 [Urine:815]    GENERAL: Alert and oriented. NAD. Conversational, appropriate.   HEENT: Normocephalic. EOMI. No icterus or injection. Nares normal.   LUNGS: Clear with minimal wheezing. No dyspnea at rest.   HEART: Regular rate. Extremities perfused.   ABDOMEN: Soft, nontender, and nondistended. Positive bowel sounds.   EXTREMITIES: No LE edema noted.   NEUROLOGIC: Moves extremities x4 on command. No acute focal neurologic abnormalities noted.     Discharge Disposition   Discharged to home  Condition at discharge: Stable    Consultations This Hospital Stay   RESPIRATORY CARE IP CONSULT  PULMONARY IP CONSULT  INTENSIVIST IP CONSULT    Time Spent on this Encounter   IPietro, personally saw the patient today and spent greater than 30 minutes discharging this patient.    Discharge Orders     Reason for your hospital stay   Shortness of breath     Follow-up and recommended labs and tests    Follow up with primary care provider, [facility], within 7 days for hospital follow- up.  The following labs/tests are recommended: cbc/bmp.    Follow up with primary care provider pulmonary medicine as directed.     Activity   Your activity upon discharge: activity as tolerated     Full Code     Diet    Follow this diet upon discharge: Orders Placed This Encounter     Combination Diet Regular Diet Adult       Discharge Medications   Current Discharge Medication List      START taking these medications    Details   levofloxacin (LEVAQUIN) 500 MG tablet Take 1 tablet (500 mg) by mouth daily for 5 days  Qty: 5 tablet, Refills: 0    Associated Diagnoses: Severe asthma with acute exacerbation, unspecified whether persistent      predniSONE (DELTASONE) 10 MG tablet 4 tabs daily for 3 days, then 3 tabs daily for 3 days, then 2 tabs daily for 3 days, then 1 tab daily for 3 days, then stop  Qty: 30 tablet, Refills: 0    Associated Diagnoses: Severe asthma with acute exacerbation, unspecified whether persistent         CONTINUE these medications which have NOT CHANGED    Details   ACETAMINOPHEN PO Take 1,000 mg by mouth 2 times daily as needed for pain       albuterol (PROAIR HFA/PROVENTIL HFA/VENTOLIN HFA) 108 (90 Base) MCG/ACT Inhaler Inhale 1-2 puffs into the lungs every 4 hours as needed for shortness of breath / dyspnea or wheezing      Atorvastatin Calcium (LIPITOR PO) Take 20 mg by mouth At Bedtime      loratadine (CLARITIN) 10 MG tablet Take 10 mg by mouth every morning      mometasone (NASONEX) 50 MCG/ACT spray Spray 2 sprays into both nostrils 2 times daily       OMEPRAZOLE PO Take 20 mg by mouth every morning      testosterone cypionate (DEPOTESTOTERONE) 200 MG/ML injection Inject 200 mg into the muscle every 14 days      VITAMIN D, CHOLECALCIFEROL, PO Take 1,000 Units by mouth every morning           Allergies   Allergies   Allergen Reactions     Aspirin Anxiety, Shortness Of Breath and Swelling     Ibuprofen Anaphylaxis     Naproxen Anaphylaxis     Data   Most Recent 3 CBC's:  Recent Labs   Lab Test  08/03/18   0545  08/01/18   1340   WBC  17.2*  10.5   HGB  13.9  15.6   MCV  87  87   PLT  231  250      Most Recent 3 BMP's:  Recent Labs   Lab Test  08/03/18   0545  08/01/18   1340   NA  140  140   POTASSIUM   4.0  3.6   CHLORIDE  108  107   CO2  24  26   BUN  17  10   CR  0.65*  0.73   ANIONGAP  8  7   CLAIR  8.1*  8.3*   GLC  185*  141*     Most Recent 2 LFT's:  Recent Labs   Lab Test  08/01/18   1340   AST  29   ALT  41   ALKPHOS  120   BILITOTAL  0.4     Most Recent INR's and Anticoagulation Dosing History:  Anticoagulation Dose History     There is no flowsheet data to display.        Most Recent 3 Troponin's:  Recent Labs   Lab Test  08/01/18   1340   TROPI  <0.015     Most Recent Cholesterol Panel:No lab results found.  Most Recent 6 Bacteria Isolates From Any Culture (See EPIC Reports for Culture Details):  Recent Labs   Lab Test  08/02/18   1920  08/02/18   1915   CULT  No growth after 8 hours  No growth after 11 hours     Most Recent TSH, T4 and A1c Labs:  Recent Labs   Lab Test  08/01/18   2010   A1C  6.3*     Results for orders placed or performed during the hospital encounter of 08/01/18   XR Chest Port 1 View    Narrative    CHEST ONE VIEW PORTABLE   8/1/2018 1:53 PM     HISTORY:  Shortness of breath. Chest pain.    COMPARISON: None.      Impression    IMPRESSION: Negative chest. Lungs clear.    MITUL REYEZ MD

## 2018-08-03 NOTE — PLAN OF CARE
Problem: Patient Care Overview  Goal: Plan of Care/Patient Progress Review  Outcome: No Change  Patient A&O x4, reported chest pain/tightness/pressure  After the news of possible infection and ECG neg for any changes, on tele ST and LA fired - LA 3.1 and Dr. Vaughn was paged with critical lab result- NS 2450 ml bolus once and 1/2 NS with KCL 20 at 75 ml/hr infusing,  and VSS after Neb and PRN Albuterol inhaller,  Lungs are diminished and on 1L NC, up IND , on regular diet, skin is WNL , voiding well and last BM 8/1. IV is in L and R arms  and infusing. Blood cultures drawn and pending. Levaquin 500mg/100 ml given Plan : to continue to monitor.

## 2018-08-03 NOTE — PROGRESS NOTES
Pt has been pain free with V/signs stable and maintaining his O2 sats in the mid 90's on Rm air. He is now ready for discharge to home with his son providing transportation. Reviewed Discharge instructions, Follow- up appts with son which he then translated to his father who is Citizen of [place] speaking. Interrupter was present til 10:30 this morning. RX's ( 2) was E-scribed to [facility] pharmacy and given to Pt upon discharge. Remains in Sinus Rhythm/ Sinus Tach

## 2018-08-03 NOTE — PROGRESS NOTES
[facility]  Pulmonary Progress Note for [facility]          Assessment and Plan:    At this time, the patient is improving significantly.  I would recommend that the patient be placed on a rapid steroid taper and continue Singulair and the antihistamine.  I would give the patient a long-acting beta agonist with steroids such as Symbicort 160/4.5 two puffs twice a day or equivalent or Breo 200 mcg 1 puff daily. Ventolin inhaler p.r.n. rescue and return to our clinic.  I gave him contact information. My associate, Dr. May, is fluent in Thai.  I do not think he needs a CT scan of his chest at this time and we will follow p.r.n.     Overall asthma better, will see as outpatient. Will sign off             Interval History:   The patient is a 43-year-old Thai-speaking man who I have helped with an ,  who was generally followed at [facility].  He has a history of Klinefelter's syndrome and has had mild persistent asthma with sinusitis in the past, nasal polyps evidently by Care Everywhere chart.  He may have had surgical treatment for this.  The patient had an acute exacerbation of his asthma and presented with rapidly progressive shortness of breath and cough following an upper respiratory infection.  He was not aware of wheezing.  He had mild sputum production, but had chest tightness and cough.  Initial blood gas showed a pH of 7.31, pCO2 of 52.  Chest x-ray was negative.  He was given steroids, antibiotics, Singulair,  loratadine, DuoNebs and he is markedly improved.  He has some hyperglycemia as well. He has underlying diabetes.  He has had gastroesophageal reflux controlled with PPIs. He does not believe his sinusitis is active at this time.  He has chronic rhinitis.  His chest x-ray was clear.  He has not had a CT of his chest.  He has had abnormal PFTs in the past he says, however, those documents were unavailable on the computer.  He was getting a cold 1  week ago with sore throat, congestion,  mild headache and cough. He had green phlegm at that time. He had no other symptoms. In the ER, his temperature was 99.2, heart rate 114, blood pressure 150/118, respiratory rates were 24. The venous blood gas showed CO2 of 52, pH of 7.31.  Chest x-ray is unremarkable.       Today no SOB, cough, wheezes, chest discomfort. Discussed sleep evaluation as out patient and follow up at our office.              Review of Systems:   No pain SOB , no complaints , no new sx             Medications:     Current Facility-Administered Medications Ordered in Epic   Medication Dose Route Frequency Last Rate Last Dose     0.45% sodium chloride + KCl 20 mEq/L infusion   Intravenous Continuous 75 mL/hr at 08/02/18 1942       acetaminophen (TYLENOL) tablet 650 mg  650 mg Oral Q4H PRN   650 mg at 08/03/18 0902     albuterol (PROAIR HFA/PROVENTIL HFA/VENTOLIN HFA) Inhaler 1-2 puff  1-2 puff Inhalation Q4H PRN   2 puff at 08/03/18 0903     albuterol neb solution 2.5 mg  3 mL Nebulization Q4H   2.5 mg at 08/03/18 0324     albuterol neb solution 2.5 mg  3 mL Nebulization Q2H PRN   2.5 mg at 08/02/18 0536     atorvastatin (LIPITOR) tablet 20 mg  20 mg Oral At Bedtime   20 mg at 08/02/18 2345     bisacodyl (DULCOLAX) Suppository 10 mg  10 mg Rectal Daily PRN         cholecalciferol (vitamin D3) tablet 1,000 Units  1,000 Units Oral Daily   1,000 Units at 08/03/18 0902     glucose gel 15-30 g  15-30 g Oral Q15 Min PRN        Or     dextrose 50 % injection 25-50 mL  25-50 mL Intravenous Q15 Min PRN        Or     glucagon injection 1 mg  1 mg Subcutaneous Q15 Min PRN         enoxaparin (LOVENOX) injection 40 mg  40 mg Subcutaneous Q24H   40 mg at 08/02/18 1844     fluticasone (FLONASE) 50 MCG/ACT spray 2 spray  2 spray Both Nostrils Daily   2 spray at 08/03/18 0902     insulin aspart (NovoLOG) inj (RAPID ACTING)  1-7 Units Subcutaneous TID AC   1 Units at 08/03/18 0900     insulin aspart (NovoLOG) inj  (RAPID ACTING)  1-5 Units Subcutaneous At Bedtime   2 Units at 08/02/18 2348     ipratropium - albuterol 0.5 mg/2.5 mg/3 mL (DUONEB) neb solution 3 mL  3 mL Nebulization Q4H While awake   3 mL at 08/03/18 0749     levofloxacin (LEVAQUIN) infusion 500 mg  500 mg Intravenous Q24H 100 mL/hr at 08/02/18 1849 500 mg at 08/02/18 1849     loratadine (CLARITIN) tablet 10 mg  10 mg Oral QAM   10 mg at 08/03/18 0902     magnesium sulfate 4 g in 100 mL sterile water (premade)  4 g Intravenous Q4H PRN         melatonin tablet 1 mg  1 mg Oral At Bedtime PRN         methylPREDNISolone sodium succinate (solu-MEDROL) injection 62.5 mg  62.5 mg Intravenous Q6H   62.5 mg at 08/03/18 0616     naloxone (NARCAN) injection 0.1-0.4 mg  0.1-0.4 mg Intravenous Q2 Min PRN         No lozenges or gum should be given while patient on BIPAP/AVAPS/AVAPS AE   Does not apply Continuous PRN         omeprazole (priLOSEC) CR capsule 20 mg  20 mg Oral QAM   20 mg at 08/03/18 0902     ondansetron (ZOFRAN-ODT) ODT tab 4 mg  4 mg Oral Q6H PRN        Or     ondansetron (ZOFRAN) injection 4 mg  4 mg Intravenous Q6H PRN         Patient may continue current oral medications   Does not apply Continuous PRN         polyethylene glycol (MIRALAX/GLYCOLAX) Packet 17 g  17 g Oral Daily PRN         potassium chloride (KLOR-CON) Packet 20-40 mEq  20-40 mEq Oral or Feeding Tube Q2H PRN         potassium chloride 10 mEq in 100 mL intermittent infusion with 10 mg lidocaine  10 mEq Intravenous Q1H PRN         potassium chloride 10 mEq in 100 mL sterile water intermittent infusion (premix)  10 mEq Intravenous Q1H PRN         potassium chloride 20 mEq in 50 mL intermittent infusion  20 mEq Intravenous Q1H PRN         potassium chloride SA (K-DUR/KLOR-CON M) CR tablet 20-40 mEq  20-40 mEq Oral Q2H PRN         prochlorperazine (COMPAZINE) injection 10 mg  10 mg Intravenous Q6H PRN        Or     prochlorperazine (COMPAZINE) tablet 10 mg  10 mg Oral Q6H PRN        Or      prochlorperazine (COMPAZINE) Suppository 25 mg  25 mg Rectal Q12H PRN         senna-docusate (SENOKOT-S;PERICOLACE) 8.6-50 MG per tablet 1 tablet  1 tablet Oral BID PRN        Or     senna-docusate (SENOKOT-S;PERICOLACE) 8.6-50 MG per tablet 2 tablet  2 tablet Oral BID PRN         sodium chloride (PF) 0.9% PF flush 3 mL  3 mL Intracatheter Q8H   3 mL at 08/03/18 0616     No current [facility]-ordered outpatient prescriptions on file.                  Physical Exam:   Vitals were reviewed  Patient Vitals for the past 12 hrs:   BP Temp Temp src Heart Rate Resp SpO2 Weight   08/03/18 0807 130/78 - - 120 17 97 % -   08/03/18 0803 - 97.6  F (36.4  C) Oral - - - -   08/03/18 0615 - - - - - - 116.6 kg (257 lb)   08/03/18 0600 133/78 - - 100 14 97 % -   08/03/18 0500 - - - 112 13 - -   08/03/18 0400 127/60 - - 117 14 94 % -   08/03/18 0300 - - - 101 15 - -   08/03/18 0200 141/78 - - 104 14 96 % -   08/03/18 0100 - - - 109 12 95 % -   08/03/18 0000 124/70 - - 117 13 97 % -   08/02/18 2351 - 98.2  F (36.8  C) Oral - - - -   08/02/18 2300 - - - 128 18 96 % -   08/02/18 2200 112/69 - - 115 12 96 % -     Constitutional:   AOX# NAD eating breakfast     Eyes:   *nl grossly     ENT:        Lungs:   Clear no cough no wheezes crackles     Cardiovascular:   normal S1 and S2 no m           Neurologic:   *up in chair moves all ext, nl judgement and insight     Skin:   *nl              Data:     CMP  Recent Labs  Lab 08/03/18  0545 08/01/18  1340    140   POTASSIUM 4.0 3.6   CHLORIDE 108 107   CO2 24 26   ANIONGAP 8 7   * 141*   BUN 17 10   CR 0.65* 0.73   GFRESTIMATED >90 >90   GFRESTBLACK >90 >90   CLAIR 8.1* 8.3*   PROTTOTAL  --  7.9   ALBUMIN  --  3.8   BILITOTAL  --  0.4   ALKPHOS  --  120   AST  --  29   ALT  --  41     CBC  Recent Labs  Lab 08/03/18  0545 08/01/18  1340   WBC 17.2* 10.5   RBC 4.62 5.15   HGB 13.9 15.6   HCT 40.4 44.9   MCV 87 87   MCH 30.1 30.3   MCHC 34.4 34.7   RDW 13.7 13.1    250     INRNo lab  results found in last 7 days.  Arterial Blood Gas  Recent Labs  Lab 08/01/18  1340   O2PER 100       Recent Labs  Lab 08/02/18  1920 08/02/18  1915   CULT No growth after 8 hours No growth after 11 hours       Richmond Gamboa MD  [facility]  [phone]

## 2018-08-08 LAB
BACTERIA SPEC CULT: NO GROWTH
BACTERIA SPEC CULT: NO GROWTH
INTERPRETATION ECG - MUSE: NORMAL
Lab: NORMAL
Lab: NORMAL
SPECIMEN SOURCE: NORMAL
SPECIMEN SOURCE: NORMAL